# Patient Record
Sex: FEMALE | Race: WHITE | NOT HISPANIC OR LATINO | URBAN - METROPOLITAN AREA
[De-identification: names, ages, dates, MRNs, and addresses within clinical notes are randomized per-mention and may not be internally consistent; named-entity substitution may affect disease eponyms.]

---

## 2017-05-12 ENCOUNTER — OUTPATIENT (OUTPATIENT)
Dept: OUTPATIENT SERVICES | Facility: HOSPITAL | Age: 22
LOS: 1 days | End: 2017-05-12
Payer: MEDICAID

## 2017-05-12 DIAGNOSIS — O26.899 OTHER SPECIFIED PREGNANCY RELATED CONDITIONS, UNSPECIFIED TRIMESTER: ICD-10-CM

## 2017-05-12 DIAGNOSIS — Z3A.00 WEEKS OF GESTATION OF PREGNANCY NOT SPECIFIED: ICD-10-CM

## 2017-05-12 PROCEDURE — 99214 OFFICE O/P EST MOD 30 MIN: CPT | Mod: 25

## 2017-05-12 PROCEDURE — 76818 FETAL BIOPHYS PROFILE W/NST: CPT | Mod: 26

## 2017-05-25 ENCOUNTER — INPATIENT (INPATIENT)
Facility: HOSPITAL | Age: 22
LOS: 1 days | Discharge: HOME | End: 2017-05-27
Attending: SPECIALIST | Admitting: SPECIALIST

## 2017-05-25 VITALS — WEIGHT: 156.53 LBS | HEIGHT: 67 IN

## 2017-05-25 DIAGNOSIS — O26.899 OTHER SPECIFIED PREGNANCY RELATED CONDITIONS, UNSPECIFIED TRIMESTER: ICD-10-CM

## 2017-05-25 DIAGNOSIS — Z3A.00 WEEKS OF GESTATION OF PREGNANCY NOT SPECIFIED: ICD-10-CM

## 2017-05-25 LAB
BASOPHILS # BLD AUTO: 0.01 K/UL — SIGNIFICANT CHANGE UP (ref 0–0.2)
BASOPHILS NFR BLD AUTO: 0.1 % — SIGNIFICANT CHANGE UP (ref 0–2)
BLD GP AB SCN SERPL QL: NEGATIVE — SIGNIFICANT CHANGE UP
EOSINOPHIL # BLD AUTO: 0.01 K/UL — SIGNIFICANT CHANGE UP (ref 0–0.5)
EOSINOPHIL NFR BLD AUTO: 0.1 % — SIGNIFICANT CHANGE UP (ref 0–6)
HCT VFR BLD CALC: 38.4 % — SIGNIFICANT CHANGE UP (ref 34.5–45)
HGB BLD-MCNC: 12.7 G/DL — SIGNIFICANT CHANGE UP (ref 11.5–15.5)
IMM GRANULOCYTES NFR BLD AUTO: 0.5 % — SIGNIFICANT CHANGE UP (ref 0–1.5)
LYMPHOCYTES # BLD AUTO: 1.61 K/UL — SIGNIFICANT CHANGE UP (ref 1–3.3)
LYMPHOCYTES # BLD AUTO: 16.7 % — SIGNIFICANT CHANGE UP (ref 13–44)
MCHC RBC-ENTMCNC: 31.8 PG — SIGNIFICANT CHANGE UP (ref 27–34)
MCHC RBC-ENTMCNC: 33.1 % — SIGNIFICANT CHANGE UP (ref 32–36)
MCV RBC AUTO: 96.2 FL — SIGNIFICANT CHANGE UP (ref 80–100)
MONOCYTES # BLD AUTO: 0.65 K/UL — SIGNIFICANT CHANGE UP (ref 0–0.9)
MONOCYTES NFR BLD AUTO: 6.7 % — SIGNIFICANT CHANGE UP (ref 2–14)
NEUTROPHILS # BLD AUTO: 7.31 K/UL — SIGNIFICANT CHANGE UP (ref 1.8–7.4)
NEUTROPHILS NFR BLD AUTO: 75.9 % — SIGNIFICANT CHANGE UP (ref 43–77)
PLATELET # BLD AUTO: 225 K/UL — SIGNIFICANT CHANGE UP (ref 150–400)
PMV BLD: 10.9 FL — SIGNIFICANT CHANGE UP (ref 7–13)
RBC # BLD: 3.99 M/UL — SIGNIFICANT CHANGE UP (ref 3.8–5.2)
RBC # FLD: 13 % — SIGNIFICANT CHANGE UP (ref 10.3–14.5)
RH IG SCN BLD-IMP: POSITIVE — SIGNIFICANT CHANGE UP
RH IG SCN BLD-IMP: POSITIVE — SIGNIFICANT CHANGE UP
WBC # BLD: 9.64 K/UL — SIGNIFICANT CHANGE UP (ref 3.8–10.5)
WBC # FLD AUTO: 9.64 K/UL — SIGNIFICANT CHANGE UP (ref 3.8–10.5)

## 2017-05-25 RX ORDER — LANOLIN
1 OINTMENT (GRAM) TOPICAL EVERY 6 HOURS
Qty: 0 | Refills: 0 | Status: DISCONTINUED | OUTPATIENT
Start: 2017-05-25 | End: 2017-05-27

## 2017-05-25 RX ORDER — OXYTOCIN 10 UNIT/ML
2 VIAL (ML) INJECTION
Qty: 30 | Refills: 0 | Status: DISCONTINUED | OUTPATIENT
Start: 2017-05-25 | End: 2017-05-25

## 2017-05-25 RX ORDER — KETOROLAC TROMETHAMINE 30 MG/ML
30 SYRINGE (ML) INJECTION ONCE
Qty: 0 | Refills: 0 | Status: DISCONTINUED | OUTPATIENT
Start: 2017-05-25 | End: 2017-05-25

## 2017-05-25 RX ORDER — IBUPROFEN 200 MG
600 TABLET ORAL EVERY 6 HOURS
Qty: 0 | Refills: 0 | Status: COMPLETED | OUTPATIENT
Start: 2017-05-25 | End: 2018-04-23

## 2017-05-25 RX ORDER — DIBUCAINE 1 %
1 OINTMENT (GRAM) RECTAL EVERY 4 HOURS
Qty: 0 | Refills: 0 | Status: DISCONTINUED | OUTPATIENT
Start: 2017-05-25 | End: 2017-05-25

## 2017-05-25 RX ORDER — AER TRAVELER 0.5 G/1
1 SOLUTION RECTAL; TOPICAL EVERY 4 HOURS
Qty: 0 | Refills: 0 | Status: DISCONTINUED | OUTPATIENT
Start: 2017-05-25 | End: 2017-05-25

## 2017-05-25 RX ORDER — PRAMOXINE HYDROCHLORIDE 150 MG/15G
1 AEROSOL, FOAM RECTAL EVERY 4 HOURS
Qty: 0 | Refills: 0 | Status: DISCONTINUED | OUTPATIENT
Start: 2017-05-25 | End: 2017-05-26

## 2017-05-25 RX ORDER — IBUPROFEN 200 MG
600 TABLET ORAL EVERY 6 HOURS
Qty: 0 | Refills: 0 | Status: DISCONTINUED | OUTPATIENT
Start: 2017-05-25 | End: 2017-05-27

## 2017-05-25 RX ORDER — PRAMOXINE HYDROCHLORIDE 150 MG/15G
1 AEROSOL, FOAM RECTAL EVERY 4 HOURS
Qty: 0 | Refills: 0 | Status: DISCONTINUED | OUTPATIENT
Start: 2017-05-25 | End: 2017-05-25

## 2017-05-25 RX ORDER — ACETAMINOPHEN 500 MG
975 TABLET ORAL EVERY 6 HOURS
Qty: 0 | Refills: 0 | Status: COMPLETED | OUTPATIENT
Start: 2017-05-25 | End: 2018-04-23

## 2017-05-25 RX ORDER — OXYCODONE HYDROCHLORIDE 5 MG/1
5 TABLET ORAL
Qty: 0 | Refills: 0 | Status: DISCONTINUED | OUTPATIENT
Start: 2017-05-25 | End: 2017-05-27

## 2017-05-25 RX ORDER — TETANUS TOXOID, REDUCED DIPHTHERIA TOXOID AND ACELLULAR PERTUSSIS VACCINE, ADSORBED 5; 2.5; 8; 8; 2.5 [IU]/.5ML; [IU]/.5ML; UG/.5ML; UG/.5ML; UG/.5ML
0.5 SUSPENSION INTRAMUSCULAR ONCE
Qty: 0 | Refills: 0 | Status: DISCONTINUED | OUTPATIENT
Start: 2017-05-25 | End: 2017-05-27

## 2017-05-25 RX ORDER — HYDROCORTISONE 1 %
1 OINTMENT (GRAM) TOPICAL EVERY 4 HOURS
Qty: 0 | Refills: 0 | Status: DISCONTINUED | OUTPATIENT
Start: 2017-05-25 | End: 2017-05-25

## 2017-05-25 RX ORDER — HYDROCORTISONE 1 %
1 OINTMENT (GRAM) TOPICAL EVERY 4 HOURS
Qty: 0 | Refills: 0 | Status: DISCONTINUED | OUTPATIENT
Start: 2017-05-25 | End: 2017-05-26

## 2017-05-25 RX ORDER — MAGNESIUM HYDROXIDE 400 MG/1
30 TABLET, CHEWABLE ORAL
Qty: 0 | Refills: 0 | Status: DISCONTINUED | OUTPATIENT
Start: 2017-05-25 | End: 2017-05-27

## 2017-05-25 RX ORDER — AER TRAVELER 0.5 G/1
1 SOLUTION RECTAL; TOPICAL EVERY 4 HOURS
Qty: 0 | Refills: 0 | Status: DISCONTINUED | OUTPATIENT
Start: 2017-05-25 | End: 2017-05-27

## 2017-05-25 RX ORDER — SODIUM CHLORIDE 9 MG/ML
3 INJECTION INTRAMUSCULAR; INTRAVENOUS; SUBCUTANEOUS EVERY 8 HOURS
Qty: 0 | Refills: 0 | Status: DISCONTINUED | OUTPATIENT
Start: 2017-05-25 | End: 2017-05-25

## 2017-05-25 RX ORDER — SIMETHICONE 80 MG/1
80 TABLET, CHEWABLE ORAL EVERY 6 HOURS
Qty: 0 | Refills: 0 | Status: DISCONTINUED | OUTPATIENT
Start: 2017-05-25 | End: 2017-05-27

## 2017-05-25 RX ORDER — DOCUSATE SODIUM 100 MG
100 CAPSULE ORAL
Qty: 0 | Refills: 0 | Status: DISCONTINUED | OUTPATIENT
Start: 2017-05-25 | End: 2017-05-27

## 2017-05-25 RX ORDER — OXYTOCIN 10 UNIT/ML
333.33 VIAL (ML) INJECTION
Qty: 20 | Refills: 0 | Status: COMPLETED | OUTPATIENT
Start: 2017-05-25

## 2017-05-25 RX ORDER — GLYCERIN ADULT
1 SUPPOSITORY, RECTAL RECTAL AT BEDTIME
Qty: 0 | Refills: 0 | Status: DISCONTINUED | OUTPATIENT
Start: 2017-05-25 | End: 2017-05-27

## 2017-05-25 RX ORDER — OXYCODONE HYDROCHLORIDE 5 MG/1
5 TABLET ORAL EVERY 4 HOURS
Qty: 0 | Refills: 0 | Status: DISCONTINUED | OUTPATIENT
Start: 2017-05-25 | End: 2017-05-27

## 2017-05-25 RX ORDER — SODIUM CHLORIDE 9 MG/ML
1000 INJECTION, SOLUTION INTRAVENOUS
Qty: 0 | Refills: 0 | Status: DISCONTINUED | OUTPATIENT
Start: 2017-05-25 | End: 2017-05-25

## 2017-05-25 RX ORDER — DIBUCAINE 1 %
1 OINTMENT (GRAM) RECTAL EVERY 4 HOURS
Qty: 0 | Refills: 0 | Status: DISCONTINUED | OUTPATIENT
Start: 2017-05-25 | End: 2017-05-27

## 2017-05-25 RX ORDER — OXYTOCIN 10 UNIT/ML
41.67 VIAL (ML) INJECTION
Qty: 20 | Refills: 0 | Status: DISCONTINUED | OUTPATIENT
Start: 2017-05-25 | End: 2017-05-25

## 2017-05-25 RX ORDER — SODIUM CHLORIDE 9 MG/ML
1000 INJECTION, SOLUTION INTRAVENOUS ONCE
Qty: 0 | Refills: 0 | Status: COMPLETED | OUTPATIENT
Start: 2017-05-25 | End: 2017-05-25

## 2017-05-25 RX ORDER — ACETAMINOPHEN 500 MG
975 TABLET ORAL EVERY 6 HOURS
Qty: 0 | Refills: 0 | Status: DISCONTINUED | OUTPATIENT
Start: 2017-05-25 | End: 2017-05-27

## 2017-05-25 RX ORDER — DIPHENHYDRAMINE HCL 50 MG
25 CAPSULE ORAL EVERY 6 HOURS
Qty: 0 | Refills: 0 | Status: DISCONTINUED | OUTPATIENT
Start: 2017-05-25 | End: 2017-05-27

## 2017-05-25 RX ORDER — OXYTOCIN 10 UNIT/ML
333.33 VIAL (ML) INJECTION
Qty: 20 | Refills: 0 | Status: DISCONTINUED | OUTPATIENT
Start: 2017-05-25 | End: 2017-05-25

## 2017-05-25 RX ADMIN — Medication 2 MILLIUNIT(S)/MIN: at 11:58

## 2017-05-25 RX ADMIN — SODIUM CHLORIDE 2000 MILLILITER(S): 9 INJECTION, SOLUTION INTRAVENOUS at 10:30

## 2017-05-25 RX ADMIN — Medication 1000 MILLIUNIT(S)/MIN: at 15:47

## 2017-05-25 RX ADMIN — Medication 30 MILLIGRAM(S): at 17:17

## 2017-05-25 RX ADMIN — SODIUM CHLORIDE 125 MILLILITER(S): 9 INJECTION, SOLUTION INTRAVENOUS at 11:00

## 2017-05-25 RX ADMIN — Medication 125 MILLIUNIT(S)/MIN: at 16:34

## 2017-05-25 NOTE — DISCHARGE NOTE OB - MATERIALS PROVIDED
Immunization Record/Breastfeeding Log/Discharge Medication Information for Patients and Families Pocket Guide/[facility] [place] Screening Program/[facility] Hearing Screen Program/Guide to Postpartum Care/Shaken Baby Prevention Handout/Birth Certificate Instructions

## 2017-05-25 NOTE — DISCHARGE NOTE OB - CARE PROVIDER_API CALL
Derrek Recinos (MD), Obstetrics and Gynecology  [address] [place] [address]  Phone: [phone]  Fax: [phone]

## 2017-05-25 NOTE — DISCHARGE NOTE OB - CARE PLAN
Principal Discharge DX:	Vaginal delivery  Goal:	RECOVERY  Instructions for follow-up, activity and diet:	REGULAR DIET  AMBULATION ECOURAGED

## 2017-05-25 NOTE — DISCHARGE NOTE OB - MEDICATION SUMMARY - MEDICATIONS TO TAKE
I will START or STAY ON the medications listed below when I get home from the hospital:    Prenatal 1 oral capsule  --  by mouth   -- Indication: For Other pregnancy-related conditions, antepartum I will START or STAY ON the medications listed below when I get home from the hospital:    acetaminophen 325 mg oral tablet  -- 3 tab(s) by mouth every 6 hours, As Needed  -- Indication: For Vaginal delivery    ibuprofen 600 mg oral tablet  -- 1 tab(s) by mouth every 6 hours, As Needed  -- Indication: For Vaginal delivery    Prenatal 1 oral capsule  --  by mouth   -- Indication: For Vaginal delivery

## 2017-05-25 NOTE — DISCHARGE NOTE OB - PATIENT PORTAL LINK FT
“You can access the FollowHealth Patient Portal, offered by [facility], by registering with the following website: [email or web address]”

## 2017-05-26 RX ORDER — HYDROCORTISONE 1 %
1 OINTMENT (GRAM) TOPICAL EVERY 4 HOURS
Qty: 0 | Refills: 0 | Status: DISCONTINUED | OUTPATIENT
Start: 2017-05-26 | End: 2017-05-27

## 2017-05-26 RX ORDER — ACETAMINOPHEN 500 MG
3 TABLET ORAL
Qty: 0 | Refills: 0 | DISCHARGE
Start: 2017-05-26

## 2017-05-26 RX ORDER — IBUPROFEN 200 MG
1 TABLET ORAL
Qty: 0 | Refills: 0 | DISCHARGE
Start: 2017-05-26

## 2017-05-26 RX ORDER — PRAMOXINE HYDROCHLORIDE 150 MG/15G
1 AEROSOL, FOAM RECTAL EVERY 4 HOURS
Qty: 0 | Refills: 0 | Status: DISCONTINUED | OUTPATIENT
Start: 2017-05-26 | End: 2017-05-27

## 2017-05-26 RX ADMIN — PRAMOXINE HYDROCHLORIDE 1 APPLICATION(S): 150 AEROSOL, FOAM RECTAL at 06:42

## 2017-05-26 RX ADMIN — PRAMOXINE HYDROCHLORIDE 1 APPLICATION(S): 150 AEROSOL, FOAM RECTAL at 02:00

## 2017-05-26 RX ADMIN — Medication 1 TABLET(S): at 14:35

## 2017-05-26 RX ADMIN — Medication 600 MILLIGRAM(S): at 14:35

## 2017-05-26 RX ADMIN — Medication 600 MILLIGRAM(S): at 15:30

## 2017-05-26 RX ADMIN — PRAMOXINE HYDROCHLORIDE 1 APPLICATION(S): 150 AEROSOL, FOAM RECTAL at 17:09

## 2017-05-26 RX ADMIN — PRAMOXINE HYDROCHLORIDE 1 APPLICATION(S): 150 AEROSOL, FOAM RECTAL at 10:35

## 2017-05-26 NOTE — LACTATION INITIAL EVALUATION - LACTATION INTERVENTIONS
initiate skin to skin/initiate hand expression routine/assisted with deep latch and positioning  discussed  signs  of  effective  feeding and  swallowing.  discussed  compression at  breast when  nbn  stops  drinking  and  is  still sucking.

## 2017-05-26 NOTE — LACTATION INITIAL EVALUATION - INTERVENTION OUTCOME
needs met/demonstrates understanding of teaching/verbalizes understanding/nbn demonstrated  deep latch and  performed  with sucking and swallowing  noted,  pt instructed to notify pediatrician if nbn not feeding 8-12 times/24 hours and not voiding and stooling according to breastfeeding log.

## 2017-05-27 LAB — T PALLIDUM AB TITR SER: NEGATIVE — SIGNIFICANT CHANGE UP

## 2017-05-27 RX ADMIN — Medication 600 MILLIGRAM(S): at 13:01

## 2017-05-27 RX ADMIN — Medication 600 MILLIGRAM(S): at 14:00

## 2017-05-27 RX ADMIN — PRAMOXINE HYDROCHLORIDE 1 APPLICATION(S): 150 AEROSOL, FOAM RECTAL at 17:54

## 2017-05-27 RX ADMIN — PRAMOXINE HYDROCHLORIDE 1 APPLICATION(S): 150 AEROSOL, FOAM RECTAL at 13:02

## 2017-05-28 VITALS
RESPIRATION RATE: 19 BRPM | HEART RATE: 96 BPM | DIASTOLIC BLOOD PRESSURE: 72 MMHG | OXYGEN SATURATION: 96 % | SYSTOLIC BLOOD PRESSURE: 111 MMHG | TEMPERATURE: 99 F

## 2019-07-15 ENCOUNTER — RESULT REVIEW (OUTPATIENT)
Age: 24
End: 2019-07-15

## 2019-08-01 PROBLEM — Z00.00 ENCOUNTER FOR PREVENTIVE HEALTH EXAMINATION: Status: ACTIVE | Noted: 2019-08-01

## 2019-08-19 ENCOUNTER — ASOB RESULT (OUTPATIENT)
Age: 24
End: 2019-08-19

## 2019-08-19 ENCOUNTER — APPOINTMENT (OUTPATIENT)
Dept: ANTEPARTUM | Facility: CLINIC | Age: 24
End: 2019-08-19
Payer: MEDICAID

## 2019-08-19 PROCEDURE — 36416 COLLJ CAPILLARY BLOOD SPEC: CPT

## 2019-08-19 PROCEDURE — 76801 OB US < 14 WKS SINGLE FETUS: CPT

## 2019-08-19 PROCEDURE — 76813 OB US NUCHAL MEAS 1 GEST: CPT

## 2019-10-11 ENCOUNTER — ASOB RESULT (OUTPATIENT)
Age: 24
End: 2019-10-11

## 2019-10-11 ENCOUNTER — TRANSCRIPTION ENCOUNTER (OUTPATIENT)
Age: 24
End: 2019-10-11

## 2019-10-11 ENCOUNTER — APPOINTMENT (OUTPATIENT)
Dept: ANTEPARTUM | Facility: CLINIC | Age: 24
End: 2019-10-11
Payer: MEDICAID

## 2019-10-11 PROCEDURE — 76805 OB US >/= 14 WKS SNGL FETUS: CPT

## 2020-02-03 ENCOUNTER — OUTPATIENT (OUTPATIENT)
Dept: OUTPATIENT SERVICES | Facility: HOSPITAL | Age: 25
LOS: 1 days | End: 2020-02-03

## 2020-02-03 ENCOUNTER — ASOB RESULT (OUTPATIENT)
Age: 25
End: 2020-02-03

## 2020-02-03 ENCOUNTER — APPOINTMENT (OUTPATIENT)
Dept: ANTEPARTUM | Facility: CLINIC | Age: 25
End: 2020-02-03
Payer: MEDICAID

## 2020-02-03 PROCEDURE — 76820 UMBILICAL ARTERY ECHO: CPT

## 2020-02-03 PROCEDURE — 76816 OB US FOLLOW-UP PER FETUS: CPT

## 2020-02-03 PROCEDURE — 76818 FETAL BIOPHYS PROFILE W/NST: CPT | Mod: 26

## 2020-02-07 DIAGNOSIS — Z3A.36 36 WEEKS GESTATION OF PREGNANCY: ICD-10-CM

## 2020-02-07 DIAGNOSIS — O36.5930 MATERNAL CARE FOR OTHER KNOWN OR SUSPECTED POOR FETAL GROWTH, THIRD TRIMESTER, NOT APPLICABLE OR UNSPECIFIED: ICD-10-CM

## 2020-02-07 DIAGNOSIS — O26.843 UTERINE SIZE-DATE DISCREPANCY, THIRD TRIMESTER: ICD-10-CM

## 2020-02-10 ENCOUNTER — APPOINTMENT (OUTPATIENT)
Dept: ANTEPARTUM | Facility: CLINIC | Age: 25
End: 2020-02-10
Payer: MEDICAID

## 2020-02-10 ENCOUNTER — ASOB RESULT (OUTPATIENT)
Age: 25
End: 2020-02-10

## 2020-02-10 ENCOUNTER — OUTPATIENT (OUTPATIENT)
Dept: OUTPATIENT SERVICES | Facility: HOSPITAL | Age: 25
LOS: 1 days | End: 2020-02-10

## 2020-02-10 ENCOUNTER — APPOINTMENT (OUTPATIENT)
Dept: ANTEPARTUM | Facility: HOSPITAL | Age: 25
End: 2020-02-10

## 2020-02-10 PROCEDURE — 76818 FETAL BIOPHYS PROFILE W/NST: CPT | Mod: 26

## 2020-02-10 PROCEDURE — 76820 UMBILICAL ARTERY ECHO: CPT

## 2020-02-18 ENCOUNTER — ASOB RESULT (OUTPATIENT)
Age: 25
End: 2020-02-18

## 2020-02-18 ENCOUNTER — APPOINTMENT (OUTPATIENT)
Dept: ANTEPARTUM | Facility: HOSPITAL | Age: 25
End: 2020-02-18

## 2020-02-18 ENCOUNTER — APPOINTMENT (OUTPATIENT)
Dept: ANTEPARTUM | Facility: CLINIC | Age: 25
End: 2020-02-18
Payer: MEDICAID

## 2020-02-18 PROCEDURE — 76819 FETAL BIOPHYS PROFIL W/O NST: CPT

## 2020-02-18 PROCEDURE — 76816 OB US FOLLOW-UP PER FETUS: CPT

## 2020-02-19 DIAGNOSIS — O36.5930 MATERNAL CARE FOR OTHER KNOWN OR SUSPECTED POOR FETAL GROWTH, THIRD TRIMESTER, NOT APPLICABLE OR UNSPECIFIED: ICD-10-CM

## 2020-02-19 DIAGNOSIS — Z3A.37 37 WEEKS GESTATION OF PREGNANCY: ICD-10-CM

## 2020-02-24 ENCOUNTER — APPOINTMENT (OUTPATIENT)
Dept: ANTEPARTUM | Facility: HOSPITAL | Age: 25
End: 2020-02-24

## 2020-02-24 ENCOUNTER — APPOINTMENT (OUTPATIENT)
Dept: ANTEPARTUM | Facility: CLINIC | Age: 25
End: 2020-02-24

## 2020-02-26 ENCOUNTER — OUTPATIENT (OUTPATIENT)
Dept: INPATIENT UNIT | Facility: HOSPITAL | Age: 25
LOS: 1 days | Discharge: HOME | End: 2020-02-26
Payer: MEDICAID

## 2020-02-26 VITALS
TEMPERATURE: 99 F | HEART RATE: 76 BPM | DIASTOLIC BLOOD PRESSURE: 57 MMHG | SYSTOLIC BLOOD PRESSURE: 98 MMHG | RESPIRATION RATE: 16 BRPM

## 2020-02-26 VITALS — DIASTOLIC BLOOD PRESSURE: 57 MMHG | SYSTOLIC BLOOD PRESSURE: 105 MMHG | HEART RATE: 78 BPM

## 2020-02-26 DIAGNOSIS — O26.899 OTHER SPECIFIED PREGNANCY RELATED CONDITIONS, UNSPECIFIED TRIMESTER: ICD-10-CM

## 2020-02-26 DIAGNOSIS — Z3A.00 WEEKS OF GESTATION OF PREGNANCY NOT SPECIFIED: ICD-10-CM

## 2020-02-26 PROCEDURE — 99213 OFFICE O/P EST LOW 20 MIN: CPT | Mod: 25

## 2020-02-26 PROCEDURE — 76815 OB US LIMITED FETUS(S): CPT | Mod: 26

## 2020-02-26 PROCEDURE — 59025 FETAL NON-STRESS TEST: CPT | Mod: 26

## 2020-02-26 NOTE — OB PROVIDER TRIAGE NOTE - ADDITIONAL INSTRUCTIONS
pt reports +FM   maternal and fetal status reassuring   seen and evaluated with dr redding  discharge home  labor precautions d/w pt  increase fluid intake  instructed on fetal kickcounts  follow up with dr loaiza on 2/28/2020  w/v discharge instructions given  discharged home

## 2020-02-26 NOTE — OB PROVIDER TRIAGE NOTE - NSOBPROVIDERNOTE_OBGYN_ALL_OB_FT
y/o  @ 39.1 wks josey presents from Dr Recinos's office for prolonged monitoring pt presented for a PNV today and c/o decreased FM x's " few days" reports +FM , pt was on NST and had an indeterminate baseline in the office denies any uc's vb or lof denies any n/v/d denies any fever or chills ap care uncomplicated thus far      abdomen: soft, nt on palp  FH baseline indeterminate   toco: irregular   T(C): 37.1 (20 @ 16:08), Max: 37.1 (20 @ 16:08)  HR: 84 (20 @ 17:05) (76 - 92)  BP: 101/51 (20 @ 17:05) (98/57 - 101/51)  RR: 16 (20 @ 16:08) (16 - 16)  SpO2: --    allergies:  TUMS- hives  med hx: denies  surg hx: denies  gyn hx: denies  ob hx:   2017  FT 7#7 , no comp  meds: PNV    will continue to monitor 25 y/o    @ 39.1 wks gest presents from Dr Recinos's office for prolonged monitoring pt presented for a PNV today and c/o decreased FM x's " few days" reports +FM , pt was on NST and had an indeterminate baseline in the office denies any uc's vb or lof denies any n/v/d denies any fever or chills ap care uncomplicated thus far      abdomen: soft, nt on palp  FH baseline indeterminate   toco: irregular   T(C): 37.1 (20 @ 16:08), Max: 37.1 (20 @ 16:08)  HR: 84 (20 @ 17:05) (76 - 92)  BP: 101/51 (20 @ 17:05) (98/57 - 101/51)  RR: 16 (20 @ 16:08) (16 - 16)  SpO2: --    allergies:  TUMS- hives  med hx: denies  surgical  hx: denies  gyn hx: denies  ob hx:   2017  FT 7#7 , no comp  meds: PNV    will continue to monitor 23 y/o    @ 39.1 wks gest presents from Dr Recinos's office for prolonged monitoring pt presented for a PNV today and c/o decreased FM x's " few days" reports +FM , pt was on NST and had an indeterminate baseline in the office denies any uc's vb or lof denies any n/v/d denies any fever or chills ap care uncomplicated thus far      abdomen: soft, nt on palp  FH baseline indeterminate   toco: irregular   T(C): 37.1 (20 @ 16:08), Max: 37.1 (20 @ 16:08)  HR: 84 (20 @ 17:05) (76 - 92)  BP: 101/51 (20 @ 17:05) (98/57 - 101/51)  RR: 16 (20 @ 16:08) (16 - 16)  SpO2: --    allergies:  TUMS- hives  med hx: denies  surgical  hx: denies  gyn hx: denies  ob hx:   2017  FT 7#7 , no comp  meds: PNV    will continue to monitor      addendum @ 1830  cat 1 FHT  toco: uterine irritability noted    plan of care d/w dr redding  continue prolonged FM  will continue to monitor 25 y/o    @ 39.1 wks gest presents from Dr Recinos's office for prolonged monitoring pt presented for a PNV today and c/o decreased FM x's " few days" reports +FM , pt was on NST and had an indeterminate baseline in the office denies any uc's vb or lof denies any n/v/d denies any fever or chills ap care uncomplicated thus far      abdomen: soft, nt on palp  FH baseline indeterminate   toco: irregular   T(C): 37.1 (20 @ 16:08), Max: 37.1 (20 @ 16:08)  HR: 84 (20 @ 17:05) (76 - 92)  BP: 101/51 (20 @ 17:05) (98/57 - 101/51)  RR: 16 (20 @ 16:08) (16 - 16)  SpO2: --    allergies:  TUMS- hives  med hx: denies  surgical  hx: denies  gyn hx: denies  ob hx:   2017  FT 7#7 , no comp  meds: PNV    will continue to monitor      addendum @ 1830  cat 1 FHT  toco: uterine irritability noted    plan of care d/w dr redding  continue prolonged FM  will continue to monitor      addendum @ 1900:  EFM tracing reviewed with Dr paez   pt seen with dr redding  cat 1 FHT  toco: irreg    TAS: BPP: 8/8 vtx posterior placenta LAMAR: 7.33       pt reports +FM   maternal and fetal status reassuring   seen and evaluated with dr redding  discharge home  labor precautions d/w pt  increase fluid intake  instructed on fetal kickcounts  follow up with dr recinos on 2020  w/v discharge instructions given  discharged home

## 2020-03-01 ENCOUNTER — TRANSCRIPTION ENCOUNTER (OUTPATIENT)
Age: 25
End: 2020-03-01

## 2020-03-01 ENCOUNTER — INPATIENT (INPATIENT)
Facility: HOSPITAL | Age: 25
LOS: 1 days | Discharge: HOME | End: 2020-03-03
Attending: SPECIALIST | Admitting: SPECIALIST

## 2020-03-01 VITALS
DIASTOLIC BLOOD PRESSURE: 64 MMHG | TEMPERATURE: 98 F | SYSTOLIC BLOOD PRESSURE: 98 MMHG | HEART RATE: 77 BPM | RESPIRATION RATE: 16 BRPM

## 2020-03-01 DIAGNOSIS — Z98.890 OTHER SPECIFIED POSTPROCEDURAL STATES: Chronic | ICD-10-CM

## 2020-03-01 DIAGNOSIS — Z3A.00 WEEKS OF GESTATION OF PREGNANCY NOT SPECIFIED: ICD-10-CM

## 2020-03-01 DIAGNOSIS — O26.899 OTHER SPECIFIED PREGNANCY RELATED CONDITIONS, UNSPECIFIED TRIMESTER: ICD-10-CM

## 2020-03-01 LAB
BASOPHILS # BLD AUTO: 0.01 K/UL — SIGNIFICANT CHANGE UP (ref 0–0.2)
BASOPHILS NFR BLD AUTO: 0.1 % — SIGNIFICANT CHANGE UP (ref 0–2)
BLD GP AB SCN SERPL QL: NEGATIVE — SIGNIFICANT CHANGE UP
EOSINOPHIL # BLD AUTO: 0.02 K/UL — SIGNIFICANT CHANGE UP (ref 0–0.5)
EOSINOPHIL NFR BLD AUTO: 0.3 % — SIGNIFICANT CHANGE UP (ref 0–6)
HCT VFR BLD CALC: 37.6 % — SIGNIFICANT CHANGE UP (ref 34.5–45)
HGB BLD-MCNC: 12.5 G/DL — SIGNIFICANT CHANGE UP (ref 11.5–15.5)
IMM GRANULOCYTES NFR BLD AUTO: 0.4 % — SIGNIFICANT CHANGE UP (ref 0–1.5)
LYMPHOCYTES # BLD AUTO: 1.89 K/UL — SIGNIFICANT CHANGE UP (ref 1–3.3)
LYMPHOCYTES # BLD AUTO: 27.3 % — SIGNIFICANT CHANGE UP (ref 13–44)
MCHC RBC-ENTMCNC: 31.9 PG — SIGNIFICANT CHANGE UP (ref 27–34)
MCHC RBC-ENTMCNC: 33.2 % — SIGNIFICANT CHANGE UP (ref 32–36)
MCV RBC AUTO: 95.9 FL — SIGNIFICANT CHANGE UP (ref 80–100)
MONOCYTES # BLD AUTO: 0.51 K/UL — SIGNIFICANT CHANGE UP (ref 0–0.9)
MONOCYTES NFR BLD AUTO: 7.4 % — SIGNIFICANT CHANGE UP (ref 2–14)
NEUTROPHILS # BLD AUTO: 4.47 K/UL — SIGNIFICANT CHANGE UP (ref 1.8–7.4)
NEUTROPHILS NFR BLD AUTO: 64.5 % — SIGNIFICANT CHANGE UP (ref 43–77)
NRBC # FLD: 0 K/UL — SIGNIFICANT CHANGE UP (ref 0–0)
PLATELET # BLD AUTO: 182 K/UL — SIGNIFICANT CHANGE UP (ref 150–400)
PMV BLD: 10.8 FL — SIGNIFICANT CHANGE UP (ref 7–13)
RBC # BLD: 3.92 M/UL — SIGNIFICANT CHANGE UP (ref 3.8–5.2)
RBC # FLD: 13.5 % — SIGNIFICANT CHANGE UP (ref 10.3–14.5)
RH IG SCN BLD-IMP: POSITIVE — SIGNIFICANT CHANGE UP
T PALLIDUM AB TITR SER: NEGATIVE — SIGNIFICANT CHANGE UP
WBC # BLD: 6.93 K/UL — SIGNIFICANT CHANGE UP (ref 3.8–10.5)
WBC # FLD AUTO: 6.93 K/UL — SIGNIFICANT CHANGE UP (ref 3.8–10.5)

## 2020-03-01 RX ORDER — METOCLOPRAMIDE HCL 10 MG
10 TABLET ORAL ONCE
Refills: 0 | Status: DISCONTINUED | OUTPATIENT
Start: 2020-03-01 | End: 2020-03-01

## 2020-03-01 RX ORDER — DIPHENHYDRAMINE HCL 50 MG
25 CAPSULE ORAL EVERY 6 HOURS
Refills: 0 | Status: DISCONTINUED | OUTPATIENT
Start: 2020-03-01 | End: 2020-03-03

## 2020-03-01 RX ORDER — INFLUENZA VIRUS VACCINE 15; 15; 15; 15 UG/.5ML; UG/.5ML; UG/.5ML; UG/.5ML
0.5 SUSPENSION INTRAMUSCULAR ONCE
Refills: 0 | Status: DISCONTINUED | OUTPATIENT
Start: 2020-03-01 | End: 2020-03-03

## 2020-03-01 RX ORDER — PRAMOXINE HYDROCHLORIDE 150 MG/15G
1 AEROSOL, FOAM RECTAL EVERY 4 HOURS
Refills: 0 | Status: DISCONTINUED | OUTPATIENT
Start: 2020-03-01 | End: 2020-03-03

## 2020-03-01 RX ORDER — AER TRAVELER 0.5 G/1
1 SOLUTION RECTAL; TOPICAL EVERY 4 HOURS
Refills: 0 | Status: DISCONTINUED | OUTPATIENT
Start: 2020-03-01 | End: 2020-03-03

## 2020-03-01 RX ORDER — OXYTOCIN 10 UNIT/ML
333.33 VIAL (ML) INJECTION
Qty: 20 | Refills: 0 | Status: COMPLETED | OUTPATIENT
Start: 2020-03-01 | End: 2020-03-01

## 2020-03-01 RX ORDER — DIBUCAINE 1 %
1 OINTMENT (GRAM) RECTAL EVERY 6 HOURS
Refills: 0 | Status: DISCONTINUED | OUTPATIENT
Start: 2020-03-01 | End: 2020-03-03

## 2020-03-01 RX ORDER — MAGNESIUM HYDROXIDE 400 MG/1
30 TABLET, CHEWABLE ORAL
Refills: 0 | Status: DISCONTINUED | OUTPATIENT
Start: 2020-03-01 | End: 2020-03-03

## 2020-03-01 RX ORDER — SODIUM CHLORIDE 9 MG/ML
1000 INJECTION, SOLUTION INTRAVENOUS
Refills: 0 | Status: DISCONTINUED | OUTPATIENT
Start: 2020-03-01 | End: 2020-03-01

## 2020-03-01 RX ORDER — IBUPROFEN 200 MG
600 TABLET ORAL EVERY 6 HOURS
Refills: 0 | Status: DISCONTINUED | OUTPATIENT
Start: 2020-03-01 | End: 2020-03-03

## 2020-03-01 RX ORDER — CITRIC ACID/SODIUM CITRATE 300-500 MG
15 SOLUTION, ORAL ORAL EVERY 6 HOURS
Refills: 0 | Status: DISCONTINUED | OUTPATIENT
Start: 2020-03-01 | End: 2020-03-01

## 2020-03-01 RX ORDER — SIMETHICONE 80 MG/1
80 TABLET, CHEWABLE ORAL EVERY 4 HOURS
Refills: 0 | Status: DISCONTINUED | OUTPATIENT
Start: 2020-03-01 | End: 2020-03-03

## 2020-03-01 RX ORDER — LANOLIN
1 OINTMENT (GRAM) TOPICAL EVERY 6 HOURS
Refills: 0 | Status: DISCONTINUED | OUTPATIENT
Start: 2020-03-01 | End: 2020-03-03

## 2020-03-01 RX ORDER — OXYCODONE HYDROCHLORIDE 5 MG/1
5 TABLET ORAL
Refills: 0 | Status: DISCONTINUED | OUTPATIENT
Start: 2020-03-01 | End: 2020-03-03

## 2020-03-01 RX ORDER — ACETAMINOPHEN 500 MG
975 TABLET ORAL
Refills: 0 | Status: DISCONTINUED | OUTPATIENT
Start: 2020-03-01 | End: 2020-03-03

## 2020-03-01 RX ORDER — SODIUM CHLORIDE 9 MG/ML
3 INJECTION INTRAMUSCULAR; INTRAVENOUS; SUBCUTANEOUS EVERY 8 HOURS
Refills: 0 | Status: DISCONTINUED | OUTPATIENT
Start: 2020-03-01 | End: 2020-03-03

## 2020-03-01 RX ORDER — OXYCODONE HYDROCHLORIDE 5 MG/1
5 TABLET ORAL ONCE
Refills: 0 | Status: DISCONTINUED | OUTPATIENT
Start: 2020-03-01 | End: 2020-03-03

## 2020-03-01 RX ORDER — KETOROLAC TROMETHAMINE 30 MG/ML
30 SYRINGE (ML) INJECTION ONCE
Refills: 0 | Status: DISCONTINUED | OUTPATIENT
Start: 2020-03-01 | End: 2020-03-01

## 2020-03-01 RX ORDER — HYDROCORTISONE 1 %
1 OINTMENT (GRAM) TOPICAL EVERY 6 HOURS
Refills: 0 | Status: DISCONTINUED | OUTPATIENT
Start: 2020-03-01 | End: 2020-03-03

## 2020-03-01 RX ORDER — IBUPROFEN 200 MG
600 TABLET ORAL EVERY 6 HOURS
Refills: 0 | Status: COMPLETED | OUTPATIENT
Start: 2020-03-01 | End: 2021-01-28

## 2020-03-01 RX ORDER — OXYTOCIN 10 UNIT/ML
333.33 VIAL (ML) INJECTION
Qty: 20 | Refills: 0 | Status: DISCONTINUED | OUTPATIENT
Start: 2020-03-01 | End: 2020-03-02

## 2020-03-01 RX ORDER — TETANUS TOXOID, REDUCED DIPHTHERIA TOXOID AND ACELLULAR PERTUSSIS VACCINE, ADSORBED 5; 2.5; 8; 8; 2.5 [IU]/.5ML; [IU]/.5ML; UG/.5ML; UG/.5ML; UG/.5ML
0.5 SUSPENSION INTRAMUSCULAR ONCE
Refills: 0 | Status: DISCONTINUED | OUTPATIENT
Start: 2020-03-01 | End: 2020-03-03

## 2020-03-01 RX ORDER — BENZOCAINE 10 %
1 GEL (GRAM) MUCOUS MEMBRANE EVERY 6 HOURS
Refills: 0 | Status: DISCONTINUED | OUTPATIENT
Start: 2020-03-01 | End: 2020-03-03

## 2020-03-01 RX ORDER — GLYCERIN ADULT
1 SUPPOSITORY, RECTAL RECTAL AT BEDTIME
Refills: 0 | Status: DISCONTINUED | OUTPATIENT
Start: 2020-03-01 | End: 2020-03-03

## 2020-03-01 RX ORDER — OXYTOCIN 10 UNIT/ML
2 VIAL (ML) INJECTION
Qty: 30 | Refills: 0 | Status: DISCONTINUED | OUTPATIENT
Start: 2020-03-01 | End: 2020-03-01

## 2020-03-01 RX ADMIN — Medication 975 MILLIGRAM(S): at 18:27

## 2020-03-01 RX ADMIN — Medication 1000 MILLIUNIT(S)/MIN: at 09:39

## 2020-03-01 RX ADMIN — Medication 600 MILLIGRAM(S): at 21:19

## 2020-03-01 RX ADMIN — Medication 600 MILLIGRAM(S): at 20:19

## 2020-03-01 RX ADMIN — SODIUM CHLORIDE 3 MILLILITER(S): 9 INJECTION INTRAMUSCULAR; INTRAVENOUS; SUBCUTANEOUS at 22:00

## 2020-03-01 RX ADMIN — SODIUM CHLORIDE 125 MILLILITER(S): 9 INJECTION, SOLUTION INTRAVENOUS at 07:38

## 2020-03-01 RX ADMIN — Medication 2 MILLIUNIT(S)/MIN: at 07:38

## 2020-03-01 RX ADMIN — Medication 975 MILLIGRAM(S): at 17:27

## 2020-03-01 NOTE — DISCHARGE NOTE OB - MEDICATION SUMMARY - MEDICATIONS TO TAKE
I will START or STAY ON the medications listed below when I get home from the hospital:  None I will START or STAY ON the medications listed below when I get home from the hospital:    acetaminophen 325 mg oral tablet  -- 3 tab(s) by mouth   -- Indication: For nsd    ibuprofen 600 mg oral tablet  -- 1 tab(s) by mouth every 6 hours  -- Indication: For nsd    Prenatal Multivitamins with Folic Acid 1 mg oral tablet  -- 1 tab(s) by mouth once a day  -- Indication: For nsd

## 2020-03-01 NOTE — OB PROVIDER TRIAGE NOTE - NSHPPHYSICALEXAM_GEN_ALL_CORE
Vital Signs Last 24 Hrs  T(C): 36.8 (01 Mar 2020 00:37), Max: 36.8 (01 Mar 2020 00:37)  T(F): 98.2 (01 Mar 2020 00:37), Max: 98.2 (01 Mar 2020 00:37)  HR: 77 (01 Mar 2020 00:44) (77 - 77)  BP: 98/64 (01 Mar 2020 00:37) (98/64 - 98/64)  RR: 16 (01 Mar 2020 00:37) (16 - 16)    Abdomen soft, nontender   lungs ctab  heart r/r/r    SVE 3-4/70/-3    Cat 1 tracing. , moderate variability, + accels, no decels   irregular contractions     TAUS cephalic   EFW [id number] by leopolds

## 2020-03-01 NOTE — OB RN DELIVERY SUMMARY - NS_SEPSISRSKCALC_OBGYN_ALL_OB_FT
EOS calculated successfully. EOS Risk Factor: 0.5/1000 live births ([facility] national incidence); GA=39w6d; Temp=98.42; ROM=3.25; GBS='Negative'; Antibiotics='No antibiotics or any antibiotics < 2 hrs prior to birth'

## 2020-03-01 NOTE — DISCHARGE NOTE OB - CARE PROVIDER_API CALL
Derrek Recinos)  Obstetrics and Gynecology  [address] [place] [address]  Phone: [phone]  Fax: [phone]  Follow Up Time:

## 2020-03-01 NOTE — DISCHARGE NOTE OB - PATIENT PORTAL LINK FT
You can access the FollowMyHealth Patient Portal offered by [facility] by registering at the following website: [email or web address]. By joining [facility]’s FollowMyHealth portal, you will also be able to view your health information using other applications (apps) compatible with our system.

## 2020-03-01 NOTE — CHART NOTE - NSCHARTNOTEFT_GEN_A_CORE
R2 Labor Note    went to assess patient for episode of non-responsiveness. patient's  called out for help after patient's epidural placement. he reports that she felt dizzy and brought her hands to her face and was briefly unresponsive. patient noted to be severely hypotensive upon initial evaluation.     T(C): 36.5 (03-01-20 @ 02:48), Max: 36.8 (03-01-20 @ 00:37)  HR: 76 (03-01-20 @ 02:48) (75 - 77)  BP: 114/78 (03-01-20 @ 02:48) (98/64 - 114/-)  RR: 16 (03-01-20 @ 02:48) (16 - 16)  SpO2: 100% (03-01-20 @ 02:10) (100% - 100%)    FHT: cat 1 throughout  toco: q5mins  VE: not performed    pt alert and responsive while team was bedside  suspect pre-syncopal vs. syncopal episode in the setting of hypotension following epidural placement  BPs improved with anesthesia medication administration  no evidence of fetal distress  continue with expectant management    CHELSEA Collier PGY2  d/w Dr. Delgado and Viktor, bedside for eval
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
## 2020-03-01 NOTE — OB RN PATIENT PROFILE - NS_REVCONTRACEPTIVE_OBGYN_ALL_OB
Nutrition Care Plan    Nutrition Diagnosis:   Inadequate intake related to respiratory status/on Bipap as evidenced by NPO.    Intervention:    Enteral formula/solution: TF to start. Recommend 1.2 calorie formula no fiber (Osmolite 1.2), start at 20 mL/hr and increase by 10-20 mL/hr every 8 hours as tolerated to goal rate of 50 mL/hr -  Enteral Nutrition Formula: 1.2 Calorie Formula - no fiber  Access Site: [place] [phone] (comment)  Goal Rate: 50 mL/hr (1440 calories, 66 gm protein, 984 mL free water from formula)     Monitoring and Evaluation:  Total energy intake:  Goal is to meet nutrition needs       No

## 2020-03-01 NOTE — DISCHARGE NOTE OB - CARE PLAN
Principal Discharge DX:	Labor without complication  Goal:	routine  Assessment and plan of treatment:	6 weeks

## 2020-03-01 NOTE — OB PROVIDER TRIAGE NOTE - NS_DISPOSITION_OBGYN_ALL_OB
Anya Wheeler PA-C    Admit to Labor and Delivery

## 2020-03-01 NOTE — OB PROVIDER H&P - NSHPPHYSICALEXAM_GEN_ALL_CORE
Vital Signs Last 24 Hrs  T(C): 36.8 (01 Mar 2020 00:37), Max: 36.8 (01 Mar 2020 00:37)  T(F): 98.2 (01 Mar 2020 00:37), Max: 98.2 (01 Mar 2020 00:37)  HR: 77 (01 Mar 2020 00:44) (77 - 77)  BP: 98/64 (01 Mar 2020 00:37) (98/64 - 98/64)  RR: 16 (01 Mar 2020 00:37) (16 - 16)    Abdomen soft, nontender   lungs ctab  heart r/r/r    SVE 3-4/70/-3    Cat 1 tracing. , moderate variability, + accels, no decels   irregular contractions     TAUS cephalic   EFW 3605 by leopolds

## 2020-03-01 NOTE — OB PROVIDER H&P - ASSESSMENT
Evidence of labor     d/w Dr. Wilcox     -Admit to L&D for expectant management   -Routine orders placed   -Approved for epidural

## 2020-03-01 NOTE — CHART NOTE - NSCHARTNOTEFT_GEN_A_CORE
R2 Labor Note    went to assess patient for cervical change    T(C): 36.5 (03-01-20 @ 02:48), Max: 36.8 (03-01-20 @ 00:37)  HR: 78 (03-01-20 @ 05:55) (57 - 82)  BP: 105/59 (03-01-20 @ 05:46) (88/50 - 118/67)  RR: 16 (03-01-20 @ 02:48) (16 - 16)  SpO2: 100% (03-01-20 @ 05:55) (90% - 100%)    FHT: cat 1  toco: q4mins  VE: 4.5/80/-3    AROMed, clear  c/w expectant management  pitocin TOMASAN    CHELSEA Collier PGY2  d/w Dr. Wilcox

## 2020-03-02 ENCOUNTER — APPOINTMENT (OUTPATIENT)
Dept: ANTEPARTUM | Facility: CLINIC | Age: 25
End: 2020-03-02

## 2020-03-02 RX ORDER — ACETAMINOPHEN 500 MG
3 TABLET ORAL
Qty: 0 | Refills: 0 | DISCHARGE
Start: 2020-03-02

## 2020-03-02 RX ORDER — IBUPROFEN 200 MG
1 TABLET ORAL
Qty: 0 | Refills: 0 | DISCHARGE
Start: 2020-03-02

## 2020-03-02 RX ADMIN — Medication 975 MILLIGRAM(S): at 06:06

## 2020-03-02 RX ADMIN — Medication 975 MILLIGRAM(S): at 06:59

## 2020-03-02 RX ADMIN — Medication 1 TABLET(S): at 13:02

## 2020-03-02 NOTE — LACTATION INITIAL EVALUATION - INTERVENTION OUTCOME
Reviewed feeding on demand, recognizing feeding cues and utilizing feeding log. Safe skin to skin, safe sleep and rooming in promoted. Pt declines positioning assistance at this time. Pt encouraged to call for assistance when needed.
verbalizes understanding/demonstrates understanding of teaching/discussed benefits of breastfeeding, supply and demand, feeding on demand, feeding cues, wet and soiled diaper log;  encouraged exclusive breastfeeding and to ask for assistance as needed./good return demonstration

## 2020-03-02 NOTE — LACTATION INITIAL EVALUATION - LACTATION INTERVENTIONS
assisted to put baby to rt. breast in football hold position with deep latch and noted to suck with stimulation/initiate skin to skin

## 2020-03-02 NOTE — LACTATION INITIAL EVALUATION - DELIVERY DATE
01-Mar-2020
Decrease Prednisone  Continue oxygen, Nebs  Maintain spo2>90%  May need honme O2
Decrease Prednisone  Continue oxygen, Nebs  Maintain spo2>90%  May need honme O2

## 2020-03-02 NOTE — PROGRESS NOTE ADULT - SUBJECTIVE AND OBJECTIVE BOX
S: Patient doing well. Minimal lochia. Pain controlled.    O: Vital Signs Last 24 Hrs  T(C): 36.6 (02 Mar 2020 05:48), Max: 36.9 (01 Mar 2020 08:57)  T(F): 97.9 (02 Mar 2020 05:48), Max: 98.42 (01 Mar 2020 08:57)  HR: 66 (02 Mar 2020 05:48) (63 - 123)  BP: 115/73 (02 Mar 2020 05:48) (101/58 - 118/70)  BP(mean): --  RR: 18 (02 Mar 2020 05:48) (17 - 18)  SpO2: 100% (02 Mar 2020 05:48) (90% - 100%)    Gen: NAD  Abd: soft, NT, ND, fundus firm below umbilicus  Lochia: moderate  Ext: no tenderness    Labs:                        12.5   6.93  )-----------( 182      ( 01 Mar 2020 02:00 )             37.6   ABpos    A: 24y PPD#1 s/p  doing well.    Plan:  continue care  discharge instructions given

## 2020-03-03 VITALS
SYSTOLIC BLOOD PRESSURE: 114 MMHG | RESPIRATION RATE: 18 BRPM | OXYGEN SATURATION: 100 % | DIASTOLIC BLOOD PRESSURE: 75 MMHG | HEART RATE: 59 BPM | TEMPERATURE: 98 F

## 2020-04-10 NOTE — OB RN PATIENT PROFILE - SURGICAL SITE INCISION
From: [facility]ie [facility] Suits  To: Lili Mathur MD  Sent: 4/9/2020 2:37 PM CDT  Subject: Other    On Monday 4/13/2020 at 2:30 I have an appointment with Dr Kelly Joy for a download. Do I come into the office or just make sure the CPAP in plugged in?     Thanks  Darinel Crane no

## 2021-09-08 NOTE — OB RN TRIAGE NOTE - FALL HARM RISK CONCLUSION
September 8, 2021       Ana Calles MD  [address]  [address]  [facility] [address]  Via Fax: [phone]      Patient: Abbi Coreas   YOB: 1978   Date of Visit: 9/8/2021       Dear Dr. Calles:    I saw your patient, Abbi Coreas, for an evaluation. Below are my notes for this visit with her.    If you have questions, please do not hesitate to call me.      Sincerely,        Marizol Ron MD        CC: No Recipients  Marizol Ron MD  9/8/2021  3:30 PM  Signed  [facility]  Hematology/Oncology Follow Up Note    Abbi Coreas   1978   [id number]       09/08/21    Reason for Visit:  Follow-up for breast cancer on anastrozole.  Chief Complaint   Patient presents with   • Follow-up       History of Present Illness:   Abbi Coreas is a 43 year old female seen today for breast cancer.    Screening mammogram and US on 10/16/20 showed 0,.6 cm round lesion in right breast was indeterminate.    US breast diagnostic on 10/20/20 showed the new 7 mm x 6 mm x 7 mm taller than wide oval mass in the right breast was at a high suspicion for malignancy.     PT underwent biopsy of right breast mass on 11/3/20 which showed malignant invasive ductal carcinoma with tubular features and rare micro calcifications, grade 1, present in multiple core fragments, longest tumor core 0.5 cm. Ductal carcinoma in situ, nuclear grade 2, cribriform type, minor component. ER positive 90%, LA positive 95%, and Ki-67 12%.    Pt underwent bilateral mastectomy and sentinel lymph node biopsy on 12/18/20. Surgical pathology showed right breast infiltrating ductal carcinoma, grade 1 of 3, measured 0.9 cm in greatest dimension and 0.6 cm from the anterior surgical resection margin. DCIS nuclear grade 2 of 3, associated with infiltrating carcinoma. Lymph nodes negative for carcinoma.    Mammaprint low risk.     Date of Service September 8th, 2021:  Ms. Coreas returns for  follow-up. Notes a toenail recently fell off. Notes some cramping as well. Implant exchange went well. She had bilateral mastectomy. She is seeing gynecologist this fall. Denies CP, SOB, leg swelling. She is vaccinated for COVID-19.    Review of Systems   Constitutional: Negative.    HENT: Negative.    Eyes: Negative.    Respiratory: Negative.    Cardiovascular: Negative.    Gastrointestinal: Negative.    Endocrine: Negative.    Genitourinary: Negative.    Musculoskeletal: Positive for arthralgias.   Skin: Negative.    Allergic/Immunologic: Negative.    Neurological: Negative.    Hematological: Negative.    Psychiatric/Behavioral: Negative.         Objective:     BP (!) 138/98   Pulse (!) 58   Temp 97.5 °F (36.4 °C)   Ht 5' 7\" (1.702 m)   Wt 74.3 kg (163 lb 12.8 oz)   LMP 08/28/2021   BMI 25.65 kg/m²   BSA 1.86 m²    ECOG [09/08/21 1529]   ECOG Performance Status 0         Physical Exam  Constitutional:       General: She is not in acute distress.     Appearance: She is well-developed. She is not diaphoretic.   HENT:      Head: Normocephalic.      Mouth/Throat:      Pharynx: No oropharyngeal exudate.   Eyes:      General: No scleral icterus.     Pupils: Pupils are equal, round, and reactive to light.   Cardiovascular:      Rate and Rhythm: Normal rate and regular rhythm.      Heart sounds: Normal heart sounds. No murmur heard.   No friction rub. No gallop.    Pulmonary:      Effort: Pulmonary effort is normal. No respiratory distress.      Breath sounds: Normal breath sounds.   Abdominal:      General: There is no distension.      Palpations: Abdomen is soft.   Musculoskeletal:         General: Normal range of motion.      Cervical back: Normal range of motion and neck supple.   Skin:     General: Skin is warm and dry.      Findings: No erythema.   Neurological:      Mental Status: She is alert and oriented to person, place, and time.      Cranial Nerves: No cranial nerve deficit.          Admission on  07/09/2021, Discharged on 07/09/2021   Component Date Value Ref Range Status   • URINE PREGNANCY,QUAL 07/09/2021 Negative  Negative Final   • Internal Procedural Controls Accep* 07/09/2021 No   Final        Imaging studies were reviewed with the following pertinent positives: none     ASSESSMENT AND PLAN:  1. Infiltrating ductal carcinoma of the right breast. Grade 1. Stage IA, T1b. Gary lymph nodes negative. Mammaprint low. ER/UT positive. Her-2 negative. Ki-67 12%. Underwent bilateral mastectomy.  Patient started tamoxifen February 1, 2021.  2. DCIS nuclear grade 2, associated with infiltrating carcinoma    · Patient started tamoxifen, will continue for 10 years. Overall tolerating well. Notes some mild muscle aches and pains. No clinical signs of DVT.  · Pt underwent bilateral mastectomy. I discussed option for MRI screening every 2 years, starting 2 years from diagnosis to look at implant and screen chest wall. Pt was agreeable.  · Continue magnesium.  · Continue exercising. This is helpful in weight maintenance and risk reduction.   · I recommend continued clinical follow-up with me. She will not need routine mammograms. Will plan to alternate 3 month follow-up visits with Dr. Hunt. Plan to eventually space out to 6 month follow-up.  · I encouraged patient to get routine cancer screenings.    · Advised pt to receive COVID-19 booster when eligible.    RTC MD 6 months.  Patient to see Dr. Hunt in 3 months.    On 09/08/21, ITabatha scribed the services personally performed by Marizol Ron MD    The documentation recorded by the scribe accurately and completely reflects the service(s) I personally performed and the decisions made by me.     Recent PHQ 2/9 Score    PHQ 2:  Date Adult PHQ 2 Score Adult PHQ 2 Interpretation   9/8/2021 0 No further screening needed       PHQ 9:       Distress Score   Distress Management Group      Distress Scale (0-10)                       Universal Safety Interventions

## 2022-06-01 ENCOUNTER — RESULT REVIEW (OUTPATIENT)
Age: 27
End: 2022-06-01

## 2022-07-12 ENCOUNTER — ASOB RESULT (OUTPATIENT)
Age: 27
End: 2022-07-12

## 2022-07-12 ENCOUNTER — APPOINTMENT (OUTPATIENT)
Dept: ANTEPARTUM | Facility: CLINIC | Age: 27
End: 2022-07-12

## 2022-07-12 PROCEDURE — 76813 OB US NUCHAL MEAS 1 GEST: CPT

## 2022-07-12 PROCEDURE — 76801 OB US < 14 WKS SINGLE FETUS: CPT

## 2022-07-15 ENCOUNTER — APPOINTMENT (OUTPATIENT)
Dept: OTOLARYNGOLOGY | Facility: CLINIC | Age: 27
End: 2022-07-15

## 2022-08-30 ENCOUNTER — APPOINTMENT (OUTPATIENT)
Dept: ANTEPARTUM | Facility: CLINIC | Age: 27
End: 2022-08-30

## 2022-08-30 ENCOUNTER — ASOB RESULT (OUTPATIENT)
Age: 27
End: 2022-08-30

## 2022-08-30 PROCEDURE — 76805 OB US >/= 14 WKS SNGL FETUS: CPT

## 2022-10-09 ENCOUNTER — OUTPATIENT (OUTPATIENT)
Dept: INPATIENT UNIT | Facility: HOSPITAL | Age: 27
LOS: 1 days | Discharge: HOME | End: 2022-10-09

## 2022-10-09 VITALS
HEART RATE: 79 BPM | SYSTOLIC BLOOD PRESSURE: 98 MMHG | TEMPERATURE: 98 F | DIASTOLIC BLOOD PRESSURE: 57 MMHG | RESPIRATION RATE: 16 BRPM

## 2022-10-09 VITALS — HEART RATE: 80 BPM | SYSTOLIC BLOOD PRESSURE: 106 MMHG | DIASTOLIC BLOOD PRESSURE: 55 MMHG

## 2022-10-09 DIAGNOSIS — Z98.890 OTHER SPECIFIED POSTPROCEDURAL STATES: Chronic | ICD-10-CM

## 2022-10-09 DIAGNOSIS — O26.899 OTHER SPECIFIED PREGNANCY RELATED CONDITIONS, UNSPECIFIED TRIMESTER: ICD-10-CM

## 2022-10-09 DIAGNOSIS — Z3A.00 WEEKS OF GESTATION OF PREGNANCY NOT SPECIFIED: ICD-10-CM

## 2022-10-09 LAB
APPEARANCE UR: CLEAR — SIGNIFICANT CHANGE UP
BILIRUB UR-MCNC: NEGATIVE — SIGNIFICANT CHANGE UP
COLOR SPEC: SIGNIFICANT CHANGE UP
DIFF PNL FLD: NEGATIVE — SIGNIFICANT CHANGE UP
GLUCOSE UR QL: NEGATIVE — SIGNIFICANT CHANGE UP
KETONES UR-MCNC: NEGATIVE — SIGNIFICANT CHANGE UP
LEUKOCYTE ESTERASE UR-ACNC: NEGATIVE — SIGNIFICANT CHANGE UP
NITRITE UR-MCNC: NEGATIVE — SIGNIFICANT CHANGE UP
PH UR: 6 — SIGNIFICANT CHANGE UP (ref 5–8)
PROT UR-MCNC: NEGATIVE — SIGNIFICANT CHANGE UP
SP GR SPEC: 1.01 — SIGNIFICANT CHANGE UP (ref 1.01–1.05)
UROBILINOGEN FLD QL: SIGNIFICANT CHANGE UP

## 2022-10-09 NOTE — OB PROVIDER TRIAGE NOTE - NSOBPROVIDERNOTE_OBGYN_ALL_OB_FT
28yo  at 25+4 presenting to triage for left back/flank pain.   Hx. two 's uncomplicated as per patient  NST-Reactive  Negative urine  Negative CVA tenderness  Physical: No step-offs, denies trauma   Pt. endorses last BM this morning and does not have issues with constipation, states she has passed flatus regularly today as well.  Pt. denies dysuria, blood in urine, endorses adequate PO hydration  Pt. educated to take PO tylenol PRN Q8 for pain, warm compress, mild massages to assist with alleviation of intermittent discomfort.  Pain 6/10 seemingly musculoskeletal in nature  Pt. denies any additional medical/surgical/psychiatric episodes or history endorsed.  Pt. denies decreased fetal movement, visualization of bloods, fluids, fever, cough, chills, sob, palpitations, n/v.  Pt. clinically cleared for discharge.  Discussed with

## 2022-10-09 NOTE — OB PROVIDER TRIAGE NOTE - HISTORY OF PRESENT ILLNESS
26yo  at 25+4 presenting to triage for left back/flank pain. Pt. denies decreased fetal movement, visualization of bloods, fluids, fever, cough, chills, sob, palpitations, n/v.

## 2022-10-09 NOTE — OB RN TRIAGE NOTE - FALL HARM RISK - UNIVERSAL INTERVENTIONS
Bed in lowest position, wheels locked, appropriate side rails in place/Call bell, personal items and telephone in reach/Instruct patient to call for assistance before getting out of bed or chair/Non-slip footwear when patient is out of bed/[place] to call system/Physically safe environment - no spills, clutter or unnecessary equipment/Purposeful Proactive Rounding/Room/bathroom lighting operational, light cord in reach

## 2022-10-19 NOTE — OB RN TRIAGE NOTE - TEMPERATURE IN CELSIUS (DEGREES C)
What Is The Reason For Today's Visit?: Annual Full Body Skin Examination with No Concerns What Is The Reason For Today's Visit? (Being Monitored For X): concerning skin lesions on an annual basis 36.8

## 2022-12-02 ENCOUNTER — OUTPATIENT (OUTPATIENT)
Dept: INPATIENT UNIT | Facility: HOSPITAL | Age: 27
LOS: 1 days | Discharge: HOME | End: 2022-12-02
Payer: MEDICAID

## 2022-12-02 VITALS — HEART RATE: 96 BPM | SYSTOLIC BLOOD PRESSURE: 91 MMHG | DIASTOLIC BLOOD PRESSURE: 53 MMHG

## 2022-12-02 VITALS
TEMPERATURE: 100 F | RESPIRATION RATE: 16 BRPM | HEART RATE: 111 BPM | DIASTOLIC BLOOD PRESSURE: 73 MMHG | SYSTOLIC BLOOD PRESSURE: 118 MMHG

## 2022-12-02 DIAGNOSIS — Z98.890 OTHER SPECIFIED POSTPROCEDURAL STATES: Chronic | ICD-10-CM

## 2022-12-02 DIAGNOSIS — Z3A.00 WEEKS OF GESTATION OF PREGNANCY NOT SPECIFIED: ICD-10-CM

## 2022-12-02 DIAGNOSIS — O26.899 OTHER SPECIFIED PREGNANCY RELATED CONDITIONS, UNSPECIFIED TRIMESTER: ICD-10-CM

## 2022-12-02 PROCEDURE — 99213 OFFICE O/P EST LOW 20 MIN: CPT | Mod: GC

## 2022-12-02 NOTE — OB PROVIDER TRIAGE NOTE - NSOBPROVIDERNOTE_OBGYN_ALL_OB_FT
26yo  at 33+2 presenting for r/o rupture in the setting of fever   - Patient is not ruptured, no signs on exam  - Will send RVP  - Will do BPP     D/w Dr. Sheng Lopez, PGY2 28yo  at 33+2 presenting for r/o rupture in the setting of fever   - Patient is not ruptured, no signs on exam  - Will send RVP  - Will do BPP     D/w Dr. Sheng Lopez, PGY2    Addendum 12:22AM   Patient seen at bedside for BPP, .   Patient overall well appearing, ruled out for rupture by Dr. Lopez PGY2. Desires discharge home.   RVP to be performed prior to discharge.   Return precautions reviewed.     d/w Dr. Sheng Bundy MD, PGY4 26yo  at 33+2 presenting for r/o rupture in the setting of fever   - Patient is not ruptured, no signs on exam  - Will send RVP  - Will do BPP     D/w Dr. Sheng Lopez, PGY2    Addendum 12:22AM   Patient seen at bedside for BPP, .   Patient overall well appearing, ruled out for rupture by Dr. Lopez PGY2. Desires discharge home.   RVP to be performed prior to discharge.   Return precautions reviewed.     d/w Dr. Sheng Bundy MD, PGY4    ------  - Patient stable for discharge home with adequate outpatient follow up  - Instructed patient to follow up with OB/GYN as scheduled  - Educated and discussed  labor precautions  - Educated and discussed concerning signs/symptoms to call OB/GYN and return to L&D including but not limited to vaginal bleeding, leakage of fluid, painful contractions, decreased fetal movement, fever/chills, shortness of breath, chest pain, rash, difficulty ambulating, nausea/vomiting/diarrhea, worsening of symptoms  - Patient states she understands and agrees with assessment and plan, all questions answered  - Discharged by BAYRON WEINSTEIN  - Discussed with Dr. Patricio 26yo  at 33+2 presenting for r/o rupture in the setting of fever   - Patient is not ruptured, no signs on exam  - Will send RVP  - Will do BPP     D/w Dr. Sheng Lopez, PGY2    Addendum 12:22AM   Patient seen at bedside for BPP, .   Patient overall well appearing, ruled out for rupture by Dr. Lopez PGY2. Desires discharge home.   RVP to be performed prior to discharge.   Return precautions reviewed.     d/w Dr. Sheng Bundy MD, PGY4    ------  - Patient stable for discharge home with adequate outpatient follow up  - Will call patient with RVP results  - Instructed patient to follow up with OB/GYN as scheduled  - Educated and discussed  labor precautions  - Educated and discussed concerning signs/symptoms to call OB/GYN and return to L&D including but not limited to vaginal bleeding, leakage of fluid, painful contractions, decreased fetal movement, fever/chills, shortness of breath, chest pain, rash, difficulty ambulating, nausea/vomiting/diarrhea, worsening of symptoms  - Patient states she understands and agrees with assessment and plan, all questions answered  - Discharged by BAYRON WEINSTEIN  - Discussed with Dr. Patricio 26yo  at 33+2 presenting for r/o rupture in the setting of fever   - Patient is not ruptured, no signs on exam  - Will send RVP  - Will do BPP     D/w Dr. Sheng Lopez, PGY2    Addendum 12:22AM   Patient seen at bedside for BPP, .   Patient overall well appearing, ruled out for rupture by Dr. Lopez PGY2. Desires discharge home.   RVP to be performed prior to discharge.   Return precautions reviewed.     d/w Dr. Sheng Bundy MD, PGY4    ------  - Patient stable for discharge home with adequate outpatient follow up  - Will call patient with RVP results  - Instructed patient to follow up with OB/GYN as scheduled  - Educated and discussed  labor precautions  - Educated and discussed concerning signs/symptoms to call OB/GYN and return to L&D including but not limited to vaginal bleeding, leakage of fluid, painful contractions, decreased fetal movement, fever/chills, shortness of breath, chest pain, rash, difficulty ambulating, nausea/vomiting/diarrhea, worsening of symptoms  - Patient states she understands and agrees with assessment and plan, all questions answered  - Discharged by BAYRON WEINSTEIN  - Discussed with Dr. Patricio    ---  12/3/2022 7:48am BAYRON WEINSTEIN  Patient called and voicemail left with flu + results
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
## 2022-12-02 NOTE — OB PROVIDER TRIAGE NOTE - NSHPPHYSICALEXAM_GEN_ALL_CORE
VS  T(C): 37.0 (12-02-22 @ 21:42)  HR: 96 (12-02-22 @ 23:22)  BP: 91/53 (12-02-22 @ 23:22)  RR: 18 (12-02-22 @ 21:42)  SpO2: --    Gen: A&Ox3 NAD well appearing   CV: RRR  Pulm: Respirations even, unlabored  Abd: Soft, gravid, nontender   Ext: no edema     TAUS: Vtx, LAMAR 11.3, BPP 8/8.

## 2022-12-03 LAB
B PERT DNA SPEC QL NAA+PROBE: SIGNIFICANT CHANGE UP
B PERT+PARAPERT DNA PNL SPEC NAA+PROBE: SIGNIFICANT CHANGE UP
BORDETELLA PARAPERTUSSIS (RAPRVP): SIGNIFICANT CHANGE UP
C PNEUM DNA SPEC QL NAA+PROBE: SIGNIFICANT CHANGE UP
FLUAV H1 2009 PAND RNA SPEC QL NAA+PROBE: DETECTED
FLUBV RNA SPEC QL NAA+PROBE: SIGNIFICANT CHANGE UP
HADV DNA SPEC QL NAA+PROBE: SIGNIFICANT CHANGE UP
HCOV 229E RNA SPEC QL NAA+PROBE: SIGNIFICANT CHANGE UP
HCOV HKU1 RNA SPEC QL NAA+PROBE: SIGNIFICANT CHANGE UP
HCOV NL63 RNA SPEC QL NAA+PROBE: SIGNIFICANT CHANGE UP
HCOV OC43 RNA SPEC QL NAA+PROBE: SIGNIFICANT CHANGE UP
HMPV RNA SPEC QL NAA+PROBE: SIGNIFICANT CHANGE UP
HPIV1 RNA SPEC QL NAA+PROBE: SIGNIFICANT CHANGE UP
HPIV2 RNA SPEC QL NAA+PROBE: SIGNIFICANT CHANGE UP
HPIV3 RNA SPEC QL NAA+PROBE: SIGNIFICANT CHANGE UP
HPIV4 RNA SPEC QL NAA+PROBE: SIGNIFICANT CHANGE UP
M PNEUMO DNA SPEC QL NAA+PROBE: SIGNIFICANT CHANGE UP
RAPID RVP RESULT: DETECTED
RSV RNA SPEC QL NAA+PROBE: SIGNIFICANT CHANGE UP
RV+EV RNA SPEC QL NAA+PROBE: SIGNIFICANT CHANGE UP
SARS-COV-2 RNA SPEC QL NAA+PROBE: SIGNIFICANT CHANGE UP

## 2022-12-06 NOTE — LACTATION INITIAL EVALUATION - REQUESTED BY
TRANSFER - OUT REPORT:    Verbal report given to RN on Christiano Montalvo  being transferred to ER [facility] for routine progression of patient care       Report consisted of patient's Situation, Background, Assessment and   Recommendations(SBAR). Information from the following report(s) ED SBAR was reviewed with the receiving nurse. Union Assessment: No data recorded  Lines:   Peripheral IV 12/05/22 Distal;Left; Anterior Cephalic (Active)        Opportunity for questions and clarification was provided.       Patient transported with:  Registered Nurse          Renae Velez RN  12/06/22 6748 patient/staff

## 2022-12-13 ENCOUNTER — ASOB RESULT (OUTPATIENT)
Age: 27
End: 2022-12-13

## 2022-12-13 ENCOUNTER — APPOINTMENT (OUTPATIENT)
Dept: ANTEPARTUM | Facility: CLINIC | Age: 27
End: 2022-12-13

## 2022-12-13 PROCEDURE — 76816 OB US FOLLOW-UP PER FETUS: CPT

## 2022-12-13 PROCEDURE — 76819 FETAL BIOPHYS PROFIL W/O NST: CPT | Mod: 59

## 2022-12-29 ENCOUNTER — OUTPATIENT (OUTPATIENT)
Dept: INPATIENT UNIT | Facility: HOSPITAL | Age: 27
LOS: 1 days | Discharge: HOME | End: 2022-12-29

## 2022-12-29 VITALS
DIASTOLIC BLOOD PRESSURE: 70 MMHG | HEART RATE: 65 BPM | SYSTOLIC BLOOD PRESSURE: 108 MMHG | TEMPERATURE: 99 F | RESPIRATION RATE: 15 BRPM

## 2022-12-29 VITALS — HEART RATE: 65 BPM | DIASTOLIC BLOOD PRESSURE: 70 MMHG | SYSTOLIC BLOOD PRESSURE: 108 MMHG

## 2022-12-29 DIAGNOSIS — O26.899 OTHER SPECIFIED PREGNANCY RELATED CONDITIONS, UNSPECIFIED TRIMESTER: ICD-10-CM

## 2022-12-29 DIAGNOSIS — Z98.890 OTHER SPECIFIED POSTPROCEDURAL STATES: Chronic | ICD-10-CM

## 2022-12-29 PROCEDURE — 76818 FETAL BIOPHYS PROFILE W/NST: CPT | Mod: 26
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
## 2022-12-29 NOTE — OB PROVIDER TRIAGE NOTE - CARE PLAN
Jono Reyes is a 51year old male. Patient presents with:  Ringing In Ear: c/o ringing in both ears for 6 weeks    HPI:   For the last 6 weeks has been experiencing extreme ringing in his ears. This becomes very loud at times.   There are times when it se per NG   • Prostatitis     per NG   • Sinus problem     Sinus problems; per NG   • Sleep apnea    • Superficial punctate keratitis 2010    OS; per NG      Social History:  Social History    Tobacco Use      Smoking status: Never Smoker      Smokeless tobac as well both of which were essentially normal.  He does not feel that his hearing is changed at all and declines an audiogram today. I discussed tinnitus in detail with him today.   Specifically, we discussed the fact that there are no reliably effective t 1

## 2022-12-29 NOTE — OB PROVIDER TRIAGE NOTE - NSOBPROVIDERNOTE_OBGYN_ALL_OB_FT
26yo  at 37 weeks R/O Labor  0300 discuss with Dr. Meyers  pt with no S/S of labor at this time/Pt report that her pain is gone.   pt to keep self very well hydrated.   stable for discharge  Discharge patient home.  Labor precautions and fetal movements count were reviewed; if not in labor, will follow up with OB for the next schedule appointment, call office tomorrow for an appointment.  Plan of care was reviewed with patient and family; patient states understanding of the above plan.

## 2022-12-29 NOTE — OB PROVIDER TRIAGE NOTE - NSHPPHYSICALEXAM_GEN_ALL_CORE
Vital Signs Last 24 Hrs  T(C): 37.1 (29 Dec 2022 02:11), Max: 37.1 (29 Dec 2022 02:11)  T(F): 98.8 (29 Dec 2022 02:11), Max: 98.8 (29 Dec 2022 02:11)  HR: 65 (29 Dec 2022 02:17) (65 - 65)  BP: 108/70 (29 Dec 2022 02:17) (108/70 - 108/70)  RR: 15 (29 Dec 2022 02:11) (15 - 15)          Gen: A&Ox3 NAD well appearing   CV: RRR  Pulm: Respirations even, unlabored  Abd: Soft, gravid, nontender   Ext: no edema   TAUS: Vital Signs Last 24 Hrs  T(C): 37.1 (29 Dec 2022 02:11), Max: 37.1 (29 Dec 2022 02:11)  T(F): 98.8 (29 Dec 2022 02:11), Max: 98.8 (29 Dec 2022 02:11)  HR: 65 (29 Dec 2022 02:17) (65 - 65)  BP: 108/70 (29 Dec 2022 02:17) (108/70 - 108/70)  RR: 15 (29 Dec 2022 02:11) (15 - 15)          Gen: A&Ox3 NAD well appearing   CV: RRR  Pulm: Respirations even, unlabored  Abd: Soft, gravid, nontender   Ext: no edema   TAUS: transverse presentation to maternal left, posterior placenta, LAMAR 10.99 BPP 8/8  SVE: 1/30/-3

## 2022-12-30 ENCOUNTER — APPOINTMENT (OUTPATIENT)
Dept: ANTEPARTUM | Facility: CLINIC | Age: 27
End: 2022-12-30
Payer: MEDICAID

## 2022-12-30 ENCOUNTER — ASOB RESULT (OUTPATIENT)
Age: 27
End: 2022-12-30

## 2022-12-30 PROCEDURE — 99202 OFFICE O/P NEW SF 15 MIN: CPT | Mod: 25

## 2022-12-30 PROCEDURE — 76816 OB US FOLLOW-UP PER FETUS: CPT

## 2022-12-30 PROCEDURE — 76819 FETAL BIOPHYS PROFIL W/O NST: CPT | Mod: 59

## 2023-01-03 DIAGNOSIS — Z03.79 ENCOUNTER FOR OTHER SUSPECTED MATERNAL AND FETAL CONDITIONS RULED OUT: ICD-10-CM

## 2023-01-03 DIAGNOSIS — O47.03 FALSE LABOR BEFORE 37 COMPLETED WEEKS OF GESTATION, THIRD TRIMESTER: ICD-10-CM

## 2023-01-03 DIAGNOSIS — Z3A.37 37 WEEKS GESTATION OF PREGNANCY: ICD-10-CM

## 2023-01-19 ENCOUNTER — INPATIENT (INPATIENT)
Facility: HOSPITAL | Age: 28
LOS: 0 days | Discharge: HOME | End: 2023-01-20
Attending: SPECIALIST | Admitting: SPECIALIST
Payer: MEDICAID

## 2023-01-19 ENCOUNTER — TRANSCRIPTION ENCOUNTER (OUTPATIENT)
Age: 28
End: 2023-01-19

## 2023-01-19 VITALS
RESPIRATION RATE: 16 BRPM | TEMPERATURE: 98 F | SYSTOLIC BLOOD PRESSURE: 108 MMHG | DIASTOLIC BLOOD PRESSURE: 71 MMHG | HEART RATE: 81 BPM

## 2023-01-19 DIAGNOSIS — O26.899 OTHER SPECIFIED PREGNANCY RELATED CONDITIONS, UNSPECIFIED TRIMESTER: ICD-10-CM

## 2023-01-19 DIAGNOSIS — Z98.890 OTHER SPECIFIED POSTPROCEDURAL STATES: Chronic | ICD-10-CM

## 2023-01-19 PROBLEM — Z78.9 OTHER SPECIFIED HEALTH STATUS: Chronic | Status: ACTIVE | Noted: 2022-12-29

## 2023-01-19 LAB
BASOPHILS # BLD AUTO: 0.01 K/UL — SIGNIFICANT CHANGE UP (ref 0–0.2)
BASOPHILS NFR BLD AUTO: 0.1 % — SIGNIFICANT CHANGE UP (ref 0–2)
BLD GP AB SCN SERPL QL: NEGATIVE — SIGNIFICANT CHANGE UP
COVID-19 SPIKE DOMAIN AB INTERP: POSITIVE
COVID-19 SPIKE DOMAIN ANTIBODY RESULT: >250 U/ML — HIGH
EOSINOPHIL # BLD AUTO: 0.02 K/UL — SIGNIFICANT CHANGE UP (ref 0–0.5)
EOSINOPHIL NFR BLD AUTO: 0.2 % — SIGNIFICANT CHANGE UP (ref 0–6)
HCT VFR BLD CALC: 33.8 % — LOW (ref 34.5–45)
HGB BLD-MCNC: 11.1 G/DL — LOW (ref 11.5–15.5)
IANC: 5.74 K/UL — SIGNIFICANT CHANGE UP (ref 1.8–7.4)
IMM GRANULOCYTES NFR BLD AUTO: 0.4 % — SIGNIFICANT CHANGE UP (ref 0–0.9)
LYMPHOCYTES # BLD AUTO: 1.79 K/UL — SIGNIFICANT CHANGE UP (ref 1–3.3)
LYMPHOCYTES # BLD AUTO: 22.2 % — SIGNIFICANT CHANGE UP (ref 13–44)
MCHC RBC-ENTMCNC: 30.5 PG — SIGNIFICANT CHANGE UP (ref 27–34)
MCHC RBC-ENTMCNC: 32.8 GM/DL — SIGNIFICANT CHANGE UP (ref 32–36)
MCV RBC AUTO: 92.9 FL — SIGNIFICANT CHANGE UP (ref 80–100)
MONOCYTES # BLD AUTO: 0.47 K/UL — SIGNIFICANT CHANGE UP (ref 0–0.9)
MONOCYTES NFR BLD AUTO: 5.8 % — SIGNIFICANT CHANGE UP (ref 2–14)
NEUTROPHILS # BLD AUTO: 5.74 K/UL — SIGNIFICANT CHANGE UP (ref 1.8–7.4)
NEUTROPHILS NFR BLD AUTO: 71.3 % — SIGNIFICANT CHANGE UP (ref 43–77)
NRBC # BLD: 0 /100 WBCS — SIGNIFICANT CHANGE UP (ref 0–0)
NRBC # FLD: 0 K/UL — SIGNIFICANT CHANGE UP (ref 0–0)
PLATELET # BLD AUTO: 187 K/UL — SIGNIFICANT CHANGE UP (ref 150–400)
RBC # BLD: 3.64 M/UL — LOW (ref 3.8–5.2)
RBC # FLD: 13.2 % — SIGNIFICANT CHANGE UP (ref 10.3–14.5)
RH IG SCN BLD-IMP: POSITIVE — SIGNIFICANT CHANGE UP
SARS-COV-2 IGG+IGM SERPL QL IA: >250 U/ML — HIGH
SARS-COV-2 IGG+IGM SERPL QL IA: POSITIVE
SARS-COV-2 RNA SPEC QL NAA+PROBE: SIGNIFICANT CHANGE UP
T PALLIDUM AB TITR SER: NEGATIVE — SIGNIFICANT CHANGE UP
WBC # BLD: 8.06 K/UL — SIGNIFICANT CHANGE UP (ref 3.8–10.5)
WBC # FLD AUTO: 8.06 K/UL — SIGNIFICANT CHANGE UP (ref 3.8–10.5)

## 2023-01-19 PROCEDURE — 76818 FETAL BIOPHYS PROFILE W/NST: CPT | Mod: 26

## 2023-01-19 RX ORDER — DIPHENHYDRAMINE HCL 50 MG
25 CAPSULE ORAL EVERY 6 HOURS
Refills: 0 | Status: DISCONTINUED | OUTPATIENT
Start: 2023-01-19 | End: 2023-01-20

## 2023-01-19 RX ORDER — PRAMOXINE HYDROCHLORIDE 150 MG/15G
1 AEROSOL, FOAM RECTAL EVERY 4 HOURS
Refills: 0 | Status: DISCONTINUED | OUTPATIENT
Start: 2023-01-19 | End: 2023-01-20

## 2023-01-19 RX ORDER — OXYCODONE HYDROCHLORIDE 5 MG/1
5 TABLET ORAL
Refills: 0 | Status: DISCONTINUED | OUTPATIENT
Start: 2023-01-19 | End: 2023-01-20

## 2023-01-19 RX ORDER — OXYTOCIN 10 UNIT/ML
VIAL (ML) INJECTION
Qty: 30 | Refills: 0 | Status: DISCONTINUED | OUTPATIENT
Start: 2023-01-19 | End: 2023-01-19

## 2023-01-19 RX ORDER — SODIUM CHLORIDE 9 MG/ML
1000 INJECTION, SOLUTION INTRAVENOUS ONCE
Refills: 0 | Status: DISCONTINUED | OUTPATIENT
Start: 2023-01-19 | End: 2023-01-19

## 2023-01-19 RX ORDER — DIBUCAINE 1 %
1 OINTMENT (GRAM) RECTAL EVERY 6 HOURS
Refills: 0 | Status: DISCONTINUED | OUTPATIENT
Start: 2023-01-19 | End: 2023-01-20

## 2023-01-19 RX ORDER — TETANUS TOXOID, REDUCED DIPHTHERIA TOXOID AND ACELLULAR PERTUSSIS VACCINE, ADSORBED 5; 2.5; 8; 8; 2.5 [IU]/.5ML; [IU]/.5ML; UG/.5ML; UG/.5ML; UG/.5ML
0.5 SUSPENSION INTRAMUSCULAR ONCE
Refills: 0 | Status: DISCONTINUED | OUTPATIENT
Start: 2023-01-19 | End: 2023-01-20

## 2023-01-19 RX ORDER — CHLORHEXIDINE GLUCONATE 213 G/1000ML
1 SOLUTION TOPICAL ONCE
Refills: 0 | Status: DISCONTINUED | OUTPATIENT
Start: 2023-01-19 | End: 2023-01-19

## 2023-01-19 RX ORDER — BENZOCAINE 10 %
1 GEL (GRAM) MUCOUS MEMBRANE EVERY 6 HOURS
Refills: 0 | Status: DISCONTINUED | OUTPATIENT
Start: 2023-01-19 | End: 2023-01-20

## 2023-01-19 RX ORDER — HYDROCORTISONE 1 %
1 OINTMENT (GRAM) TOPICAL EVERY 6 HOURS
Refills: 0 | Status: DISCONTINUED | OUTPATIENT
Start: 2023-01-19 | End: 2023-01-20

## 2023-01-19 RX ORDER — MAGNESIUM HYDROXIDE 400 MG/1
30 TABLET, CHEWABLE ORAL
Refills: 0 | Status: DISCONTINUED | OUTPATIENT
Start: 2023-01-19 | End: 2023-01-20

## 2023-01-19 RX ORDER — CITRIC ACID/SODIUM CITRATE 300-500 MG
15 SOLUTION, ORAL ORAL EVERY 6 HOURS
Refills: 0 | Status: DISCONTINUED | OUTPATIENT
Start: 2023-01-19 | End: 2023-01-19

## 2023-01-19 RX ORDER — OXYTOCIN 10 UNIT/ML
4 VIAL (ML) INJECTION
Qty: 30 | Refills: 0 | Status: DISCONTINUED | OUTPATIENT
Start: 2023-01-19 | End: 2023-01-20

## 2023-01-19 RX ORDER — ACETAMINOPHEN 500 MG
975 TABLET ORAL
Refills: 0 | Status: DISCONTINUED | OUTPATIENT
Start: 2023-01-19 | End: 2023-01-20

## 2023-01-19 RX ORDER — OXYCODONE HYDROCHLORIDE 5 MG/1
5 TABLET ORAL ONCE
Refills: 0 | Status: DISCONTINUED | OUTPATIENT
Start: 2023-01-19 | End: 2023-01-20

## 2023-01-19 RX ORDER — SODIUM CHLORIDE 9 MG/ML
3 INJECTION INTRAMUSCULAR; INTRAVENOUS; SUBCUTANEOUS EVERY 8 HOURS
Refills: 0 | Status: DISCONTINUED | OUTPATIENT
Start: 2023-01-19 | End: 2023-01-20

## 2023-01-19 RX ORDER — LANOLIN
1 OINTMENT (GRAM) TOPICAL EVERY 6 HOURS
Refills: 0 | Status: DISCONTINUED | OUTPATIENT
Start: 2023-01-19 | End: 2023-01-20

## 2023-01-19 RX ORDER — IBUPROFEN 200 MG
600 TABLET ORAL EVERY 6 HOURS
Refills: 0 | Status: DISCONTINUED | OUTPATIENT
Start: 2023-01-19 | End: 2023-01-20

## 2023-01-19 RX ORDER — KETOROLAC TROMETHAMINE 30 MG/ML
30 SYRINGE (ML) INJECTION ONCE
Refills: 0 | Status: DISCONTINUED | OUTPATIENT
Start: 2023-01-19 | End: 2023-01-20

## 2023-01-19 RX ORDER — SIMETHICONE 80 MG/1
80 TABLET, CHEWABLE ORAL EVERY 4 HOURS
Refills: 0 | Status: DISCONTINUED | OUTPATIENT
Start: 2023-01-19 | End: 2023-01-20

## 2023-01-19 RX ORDER — OXYTOCIN 10 UNIT/ML
333.33 VIAL (ML) INJECTION
Qty: 20 | Refills: 0 | Status: DISCONTINUED | OUTPATIENT
Start: 2023-01-19 | End: 2023-01-19

## 2023-01-19 RX ORDER — AER TRAVELER 0.5 G/1
1 SOLUTION RECTAL; TOPICAL EVERY 4 HOURS
Refills: 0 | Status: DISCONTINUED | OUTPATIENT
Start: 2023-01-19 | End: 2023-01-20

## 2023-01-19 RX ORDER — IBUPROFEN 200 MG
600 TABLET ORAL EVERY 6 HOURS
Refills: 0 | Status: COMPLETED | OUTPATIENT
Start: 2023-01-19 | End: 2023-12-18

## 2023-01-19 RX ORDER — ACETAMINOPHEN 500 MG
3 TABLET ORAL
Qty: 0 | Refills: 0 | DISCHARGE
Start: 2023-01-19

## 2023-01-19 RX ORDER — OXYTOCIN 10 UNIT/ML
333.33 VIAL (ML) INJECTION
Qty: 20 | Refills: 0 | Status: DISCONTINUED | OUTPATIENT
Start: 2023-01-19 | End: 2023-01-20

## 2023-01-19 RX ORDER — SODIUM CHLORIDE 9 MG/ML
1000 INJECTION, SOLUTION INTRAVENOUS
Refills: 0 | Status: DISCONTINUED | OUTPATIENT
Start: 2023-01-19 | End: 2023-01-19

## 2023-01-19 RX ORDER — IBUPROFEN 200 MG
1 TABLET ORAL
Qty: 0 | Refills: 0 | DISCHARGE
Start: 2023-01-19

## 2023-01-19 RX ADMIN — Medication 600 MILLIGRAM(S): at 18:27

## 2023-01-19 RX ADMIN — Medication 975 MILLIGRAM(S): at 21:33

## 2023-01-19 RX ADMIN — Medication 975 MILLIGRAM(S): at 22:15

## 2023-01-19 RX ADMIN — Medication 1000 MILLIUNIT(S)/MIN: at 14:06

## 2023-01-19 RX ADMIN — Medication 975 MILLIGRAM(S): at 16:00

## 2023-01-19 RX ADMIN — Medication 4 MILLIUNIT(S)/MIN: at 08:59

## 2023-01-19 RX ADMIN — Medication 600 MILLIGRAM(S): at 18:03

## 2023-01-19 RX ADMIN — Medication 975 MILLIGRAM(S): at 15:15

## 2023-01-19 NOTE — OB PROVIDER H&P - PROBLEM SELECTOR PLAN 1
patient admitted for early labor.  plan d/w Dr. Meyers  for pitocin for augmentation  for epidural for pain management  routine orders  gbs negative  covid pcr sent   consents signed by patient

## 2023-01-19 NOTE — OB PROVIDER IHI INDUCTION/AUGMENTATION NOTE - NS_LMP_OBGYN_ALL_OB_DT
Pt presents to the ED with complaints of abdominal cramping that radiates to her back and frequent urination.  No burning with urination, and she has also noticed increased discharged the past day.    13-Apr-2022

## 2023-01-19 NOTE — DISCHARGE NOTE OB - MEDICATION SUMMARY - MEDICATIONS TO TAKE
I will START or STAY ON the medications listed below when I get home from the hospital:    acetaminophen 325 mg oral tablet  -- 3 tab(s) by mouth every 8 hours  -- Indication: For Normal labor and delivery    ibuprofen 600 mg oral tablet  -- 1 tab(s) by mouth every 6 hours  -- Indication: For Normal labor and delivery    Prenatal Multivitamins with Folic Acid 1 mg oral tablet  -- 1 tab(s) by mouth once a day  -- Indication: For Normal labor and delivery

## 2023-01-19 NOTE — DISCHARGE NOTE OB - PATIENT PORTAL LINK FT
You can access the FollowMyHealth Patient Portal offered by [facility] by registering at the following website: [email or web address]. By joining [facility]’s FollowMyHealth portal, you will also be able to view your health information using other applications (apps) compatible with our system.

## 2023-01-19 NOTE — DISCHARGE NOTE OB - CARE PROVIDER_API CALL
Derrek Recinos)  Obstetrics and Gynecology  [address] [place] [address]  Phone: [phone]  Fax: [phone]  Follow Up Time: 1 month

## 2023-01-19 NOTE — DISCHARGE NOTE OB - NS MD DC FALL RISK RISK
For information on Fall & Injury Prevention, visit: [email or web address] OR  [email or web address] OR  https://www.cdc.gov/steadi/patient.html

## 2023-01-19 NOTE — OB PROVIDER H&P - NSLOWPPHRISK_OBGYN_A_OB
No previous uterine incision/[place] Pregnancy/Less than or equal to 4 previous vaginal births/No known bleeding disorder/No history of postpartum hemorrhage/No other PPH risks indicated

## 2023-01-19 NOTE — DISCHARGE NOTE OB - PLAN OF CARE
REGULAR DIET  AMBULATION ENCOURAGED Regular diet. Resume normal activity as tolerated. Follow up in the office in 6 weeks for a postpartum visit. No heavy lifting, driving, or strenuous activity for 2 weeks. Nothing per vagina such as tampons, intercourse, douches or tub baths for 6 weeks or until you see your doctor. Call your doctor with any signs and symptoms of infection such as fever, chills, nausea or vomiting. Call your doctor if you're unable to tolerate food, increase in vaginal bleeding or have difficulty urinating. Call your doctor if you have pain that is not relieved by your prescribed medications. Notify your doctor with any other concerns.

## 2023-01-19 NOTE — OB RN TRIAGE NOTE - FALL HARM RISK - UNIVERSAL INTERVENTIONS
Bed in lowest position, wheels locked, appropriate side rails in place/Call bell, personal items and telephone in reach/Instruct patient to call for assistance before getting out of bed or chair/Non-slip footwear when patient is out of bed/[place] to call system/Physically safe environment - no spills, clutter or unnecessary equipment/Purposeful Proactive Rounding/Room/bathroom lighting operational, light cord in reach

## 2023-01-19 NOTE — OB PROVIDER TRIAGE NOTE - NSOBPROVIDERNOTE_OBGYN_ALL_OB_FT
plan d/w Dr. Meyers  patient to be re-examined in 2 hours.  will continue to monitor plan d/w Dr. Meyers  patient to be re-examined in 2 hours.  will continue to monitor    0545: Repeat VE 2.5/50/-3, irregular contractions on toco   plan d/w Dr. Meyers  patient to be admitted for early labor.  for epidural and Pitocin for augmentation

## 2023-01-19 NOTE — DISCHARGE NOTE OB - MATERIALS PROVIDED
Vaccinations/[facility]  Screening Program/  Immunization Record/Breastfeeding Log/Bottle Feeding Log/Breastfeeding Mother’s Support Group Information/Guide to Postpartum Care/[facility] Hearing Screen Program/Back To Sleep Handout/Shaken Baby Prevention Handout/Breastfeeding Guide and Packet/Birth Certificate Instructions/Tdap Vaccination (VIS Pub Date: 2012)

## 2023-01-19 NOTE — OB RN PATIENT PROFILE - FALL HARM RISK - UNIVERSAL INTERVENTIONS
Bed in lowest position, wheels locked, appropriate side rails in place/Call bell, personal items and telephone in reach/Instruct patient to call for assistance before getting out of bed or chair/Non-slip footwear when patient is out of bed/[place] to call system/Physically safe environment - no spills, clutter or unnecessary equipment/Purposeful Proactive Rounding/Room/bathroom lighting operational, light cord in reach

## 2023-01-19 NOTE — OB RN DELIVERY SUMMARY - NSSELHIDDEN_OBGYN_ALL_OB_FT
[NS_DeliveryAttending1_OBGYN_ALL_OB_FT:[id number]],[NS_DeliveryAssist1_OBGYN_ALL_OB_FT:[id number]],[NS_DeliveryRN_OBGYN_ALL_OB_FT:[id number]=]

## 2023-01-19 NOTE — OB PROVIDER TRIAGE NOTE - NSHPPHYSICALEXAM_GEN_ALL_CORE
Vital Signs Last 24 Hrs  T(C): 36.6 (19 Jan 2023 02:41), Max: 36.6 (19 Jan 2023 02:30)  T(F): 97.88 (19 Jan 2023 02:41), Max: 97.9 (19 Jan 2023 02:30)  HR: 65 (19 Jan 2023 02:49) (65 - 81)  BP: 111/71 (19 Jan 2023 02:49) (108/71 - 111/71)  BP(mean): --  RR: 16 (19 Jan 2023 02:30) (16 - 16)  SpO2: --    Assessment reveals VSS  General: Female sitting comfortably in no apparent distress  Neuro: No facial asymmetry, no slurred speech, moves all 4 extremities  Mood: Alert and lucid, appropriate mood and affect  Abdomen soft, NT, gravid  FHr 135 moderate variability with accels, no decels, ctx every 3-7 min   Transabdominal Ultrasound- cephalic   Vaginal Exam- 1.5/50/-3  A&Ox3  Lungs- clear bilateral  Heart- normal rate and regular rhythm  Extremities- Warm, Dry, no edema present, good pulses Vital Signs Last 24 Hrs  T(C): 36.6 (19 Jan 2023 02:41), Max: 36.6 (19 Jan 2023 02:30)  T(F): 97.88 (19 Jan 2023 02:41), Max: 97.9 (19 Jan 2023 02:30)  HR: 65 (19 Jan 2023 02:49) (65 - 81)  BP: 111/71 (19 Jan 2023 02:49) (108/71 - 111/71)  BP(mean): --  RR: 16 (19 Jan 2023 02:30) (16 - 16)  SpO2: --    Assessment reveals VSS  General: Female sitting comfortably in no apparent distress  Neuro: No facial asymmetry, no slurred speech, moves all 4 extremities  Mood: Alert and lucid, appropriate mood and affect  Abdomen soft, NT, gravid  FHr 135 moderate variability with accels, no decels, ctx every 3-7 min   Transabdominal Ultrasound- bpp 8/8, cephalic, posterior placenta, LAMAR 10.   Vaginal Exam- 1.5/50/-3  A&Ox3  Lungs- clear bilateral  Heart- normal rate and regular rhythm  Extremities- Warm, Dry, no edema present, good pulses

## 2023-01-19 NOTE — OB PROVIDER LABOR PROGRESS NOTE - NS_SUBJECTIVE/OBJECTIVE_OBGYN_ALL_OB_FT
Patient seen due to noted variable decelerations and examined for cervical dilation assessment. Epidural in place and effective. Patient has no complaints.    Vital Signs Last 24 Hrs  T(C): 37.0 (19 Jan 2023 08:29), Max: 37.0 (19 Jan 2023 08:29)  T(F): 98.6 (19 Jan 2023 08:29), Max: 98.6 (19 Jan 2023 08:29)  HR: 63 (19 Jan 2023 10:37) (63 - 106)  BP: 104/58 (19 Jan 2023 10:31) (100/60 - 132/65)  RR: 18 (19 Jan 2023 07:22) (16 - 20)  SpO2: 93% (19 Jan 2023 10:37) (91% - 100%)
Patient re-examined due to persistent variable decelerations

## 2023-01-19 NOTE — OB PROVIDER H&P - NSHPPHYSICALEXAM_GEN_ALL_CORE
Vital Signs Last 24 Hrs  T(C): 36.6 (19 Jan 2023 02:41), Max: 36.6 (19 Jan 2023 02:30)  T(F): 97.88 (19 Jan 2023 02:41), Max: 97.9 (19 Jan 2023 02:30)  HR: 65 (19 Jan 2023 02:49) (65 - 81)  BP: 111/71 (19 Jan 2023 02:49) (108/71 - 111/71)  BP(mean): --  RR: 16 (19 Jan 2023 02:30) (16 - 16)  SpO2: --    Assessment reveals VSS  General: Female sitting comfortably in no apparent distress  Neuro: No facial asymmetry, no slurred speech, moves all 4 extremities  Mood: Alert and lucid, appropriate mood and affect  Abdomen soft, NT, gravid  FHr 135 moderate variability with accels, no decels, ctx every 3-7 min   Transabdominal Ultrasound- bpp 8/8, cephalic, posterior placenta, LAMAR 10.   Vaginal Exam- 1.5/50/-3  A&Ox3  Lungs- clear bilateral  Heart- normal rate and regular rhythm  Extremities- Warm, Dry, no edema present, good pulses

## 2023-01-19 NOTE — OB PROVIDER H&P - NS_FETALPRESSONO_OBGYN_ALL_OB
----- Message from Jodi Castillo MD sent at 7/30/2018  7:44 AM CDT -----  RN, please call the mother to let her know that the phenobarbital and primidone levels are slightly low, but since they have been effective at this dose for Erla Fort, we do not need
See other TE 7/31/18.
Cephalic

## 2023-01-19 NOTE — OB NEONATOLOGY/PEDIATRICIAN DELIVERY SUMMARY - NSPEDSNEONOTESA_OBGYN_ALL_OB_FT
Peds called to LDR for category 2 FHR tracing. 40.1 female born via  to a 28 y/o  mother. No significant maternal or prenatal history. Maternal labs include Blood Type AB+ , HIV -, RPR NR , Rubella I , Hep B - , GBS-, COVID -. ROM at  0930 on  with clear fluids (1.5H).  Baby emerged vigorous, crying, was warmed, dried, suctioned, and stimulated with APGARS of 8/9. Nuchal x1. Mom plans to initiate breastfeeding, consents Hep B vaccine and consents declines circ.  Highest maternal temp 37. EOS 0.08.
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
## 2023-01-19 NOTE — OB PROVIDER DELIVERY SUMMARY - NSPROVIDERDELIVERYNOTE_OBGYN_ALL_OB_FT
Patient examined by Mahesh MILLS and Grisel CARPIO for category 2 FHT for intermittent decelerations. Found to be 10/100/0. Peds called to bedside for cat 2 tracing. Spontaneous vaginal delivery of liveborn female infant in CLARITZA position. Head delivered easily. Nuchal x1 noted and reduced. Shoulders and body delivered easily after. Compound presentation of posterior hand was noted. Infant was suctioned. No mec was noted. After one minute, the cord was clamped and cut. Infant was passed to awaiting peds. Placenta delivered intact with a 3 vessel cord noted. Uterine fundal massage and post partum pitocin were started. Uterine fundus was firm. Vaginal exam was performed and noted intact cervix, vaginal walls and sulci. Small first degree laceration was repaired with chromic suture. Excellent hemostasis was noted. Count was correct. Pt was stable after delivery.

## 2023-01-19 NOTE — OB PROVIDER DELIVERY SUMMARY - NSSELHIDDEN_OBGYN_ALL_OB_FT
[NS_DeliveryAttending1_OBGYN_ALL_OB_FT:[id number]],[NS_DeliveryAssist1_OBGYN_ALL_OB_FT:[id number]=]

## 2023-01-19 NOTE — OB PROVIDER LABOR PROGRESS NOTE - ASSESSMENT
27 y.o  @ 40w1d in labor, currently with category II tracing    patient making cervical change  repositioned to lateral side, then high fowlers with improvement in decels  consider IUPC/amnioinfusion if variables persist  Dr. Recinos made aware of FHR tracing    Deedee Aragon NP
making cervical change  Dr. Recinos now present at bedside  Patient repositioned, followed by persisted variables  re-examination by MD Recinos, fully dilated  to begin pushing with contractions    Deedee Aragon NP

## 2023-01-19 NOTE — DISCHARGE NOTE OB - CARE PLAN
1 Principal Discharge DX:	Normal vaginal delivery  Assessment and plan of treatment:	REGULAR DIET  AMBULATION ENCOURAGED   Principal Discharge DX:	Normal vaginal delivery  Assessment and plan of treatment:	Regular diet. Resume normal activity as tolerated. Follow up in the office in 6 weeks for a postpartum visit. No heavy lifting, driving, or strenuous activity for 2 weeks. Nothing per vagina such as tampons, intercourse, douches or tub baths for 6 weeks or until you see your doctor. Call your doctor with any signs and symptoms of infection such as fever, chills, nausea or vomiting. Call your doctor if you're unable to tolerate food, increase in vaginal bleeding or have difficulty urinating. Call your doctor if you have pain that is not relieved by your prescribed medications. Notify your doctor with any other concerns.

## 2023-01-19 NOTE — OB RN DELIVERY SUMMARY - NS_SEPSISRSKCALC_OBGYN_ALL_OB_FT
EOS calculated successfully. EOS Risk Factor: 0.5/1000 live births ([facility] [place] national incidence); GA=40w1d; Temp=98.6; ROM=1.617; GBS='Negative'; Antibiotics='No antibiotics or any antibiotics < 2 hrs prior to birth'

## 2023-01-20 VITALS
RESPIRATION RATE: 18 BRPM | SYSTOLIC BLOOD PRESSURE: 122 MMHG | TEMPERATURE: 98 F | OXYGEN SATURATION: 96 % | DIASTOLIC BLOOD PRESSURE: 70 MMHG | HEART RATE: 94 BPM

## 2023-01-20 RX ADMIN — Medication 975 MILLIGRAM(S): at 09:10

## 2023-01-20 RX ADMIN — Medication 600 MILLIGRAM(S): at 05:42

## 2023-01-20 RX ADMIN — Medication 975 MILLIGRAM(S): at 09:00

## 2023-01-20 RX ADMIN — Medication 600 MILLIGRAM(S): at 11:44

## 2023-01-20 NOTE — PROGRESS NOTE ADULT - SUBJECTIVE AND OBJECTIVE BOX
S: Patient doing well. Minimal lochia. Pain controlled.    O: Vital Signs Last 24 Hrs  T(C): 36.5 (2023 06:03), Max: 37.6 (2023 14:58)  T(F): 97.7 (2023 06:03), Max: 99.7 (2023 14:58)  HR: 59 (2023 06:03) (59 - 115)  BP: 110/67 (2023 06:03) (100/56 - 132/65)  BP(mean): --  RR: 18 (2023 06:03) (16 - 18)  SpO2: 100% (2023 06:03) (90% - 100%)    Parameters below as of 2023 22:00  Patient On (Oxygen Delivery Method): room air        Gen: NAD  Abd: soft, NT, ND, fundus firm below umbilicus  Lochia: moderate  Ext: no tenderness    Labs:                        11.1   8.06  )-----------( 187      ( 2023 06:55 )             33.8       A: 27y PPD# 1 s/p  doing well.  Plan: Routine care Dc per routine

## 2023-02-16 NOTE — OB PROVIDER LABOR PROGRESS NOTE - NSVAGINALEXAM_OBGYN_ALL_OB_DT
19-Jan-2023 10:25
19-Jan-2023 10:51
Wartpeel Counseling:  I discussed with the patient the risks of Wartpeel including but not limited to erythema, scaling, itching, weeping, crusting, and pain.

## 2023-04-20 NOTE — OB PROVIDER TRIAGE NOTE - NS_FETALPRESSONO_OBGYN_ALL_OB
Transverse Tranexamic Acid Pregnancy And Lactation Text: It is unknown if this medication is safe during pregnancy or breast feeding.

## 2023-07-25 NOTE — OB RN PATIENT PROFILE - BLOOD TRANSFUSION, PREVIOUS, PROFILE
ICC VISIT NOTE       Subjective   Patient Disposition:   Caroline is a 45 year old female and is being seen in our office for   Chief Complaint   Patient presents with   • Asthma     Pt arrives with c/o asthma exacerbation x 5 days. Pt reports using inhaler last around 0900. Pt reports running out of nebulizer last night. Pt denies any fevers. Pt is unable to get in to PCP.          HPI patient with an asthma exacerbation for the last 5 days.  Has run out of her nebulizer since 9 this morning--patient also has a cough productive of some yellow to green sputum.        MEDICATIONS:  Current Outpatient Medications   Medication Sig   • albuterol 108 (90 Base) MCG/ACT inhaler Inhale 2 puffs into the lungs every 6 hours as needed for Shortness of Breath or Wheezing.   • albuterol (VENTOLIN) (2.5 MG/3ML) 0.083% nebulizer solution Take 3 mLs by nebulization every 4 hours as needed for Wheezing.   • azithromycin (Zithromax) 500 MG tablet Take 1 tablet by mouth daily for 5 days.   • predniSONE (DELTASONE) 20 MG tablet Take 1 tablet by mouth daily for 7 days.   • albuterol 108 (90 Base) MCG/ACT inhaler INHALE 2 PUFFS INTO THE LUNGS EVERY 4 HOURS AS NEEDED FOR SHORTNESS OF BREATH OR WHEEZING   • montelukast (SINGULAIR) 10 MG tablet Take 1 tablet by mouth nightly.   • albuterol (VENTOLIN) (2.5 MG/3ML) 0.083% nebulizer solution Take 3 mLs by nebulization every 4 hours as needed for Wheezing or Shortness of Breath.   • Ascorbic Acid (VITAMIN C PO) Take 1 tablet by mouth daily.   • MAGNESIUM PO Take 1 tablet by mouth daily.   • Multiple Vitamin (MULTIVITAMIN ADULT PO) Take 1 tablet by mouth daily.   • Melatonin 10 MG Tab Take 10 mg by mouth daily.   • L-LYSINE PO Take 1 tablet by mouth daily.   • fluticasone-salmeterol 250-50 MCG/ACT inhaler Inhale 1 puff into the lungs in the morning and 1 puff in the evening.   • loratadine (CLARITIN) 10 MG tablet Take 10 mg by mouth daily.     No current facility-administered medications for  this visit.       ALLERGIES:  ALLERGIES:   Allergen Reactions   • Amoxicillin RASH       PAST MEDICAL HISTORY:  Past Medical History:   Diagnosis Date   • Allergy    • Depression    • Pneumonia     recent - pt states she is recovered   • RAD (reactive airway disease)    • Sinusitis, chronic        PAST SURGICAL HISTORY:  Past Surgical History:   Procedure Laterality Date   • Endometrial ablation     • Hernia repair      umbilical   • Ovary surgery      ovarian cyst       FAMILY HISTORY:  Family History   Problem Relation Age of Onset   • Heart disease Father    • Asthma Father    • ALS Father        SOCIAL HISTORY:  Social History     Tobacco Use   • Smoking status: Former     Current packs/day: 0.00     Types: Cigarettes     Quit date:      Years since quittin.5   • Smokeless tobacco: Never   • Tobacco comments:     quit 8/10/2021   Vaping Use   • Vaping Use: never used   Substance Use Topics   • Alcohol use: Not Currently   • Drug use: Not Currently     Types: Marijuana     Comment: edibles occas         Patient's medications, allergies, past medical, surgical, and social history  were reviewed and updated as appropriate.    REVIEW OF SYSTEMS  Review of Systems   Constitutional: Negative.    HENT: Negative.    Eyes: Negative.    Respiratory: Positive for cough and wheezing.    Cardiovascular: Negative.    Gastrointestinal: Negative.    Endocrine: Negative.    Genitourinary: Negative.    Musculoskeletal: Negative.    Skin: Negative.    Allergic/Immunologic: Negative.    Neurological: Negative.    Hematological: Negative.    Psychiatric/Behavioral: Negative.    All other systems reviewed and are negative.      Vitals:    23 1113   BP: 97/52   BP Location: RUE - Right upper extremity   Patient Position: Sitting   Cuff Size: Large Adult   Pulse: 88   Resp: 16   Temp: 97.5 °F (36.4 °C)   TempSrc: Oral   SpO2: 96%   Weight: 88.5 kg (195 lb)   Height: 5' 5\" (1.651 m)   PainSc:  0   LMP: 2022         POINT OF CARE TEST RESULTS  No results found for this visit on 07/25/23.      Objective     Physical Exam  Vitals and nursing note reviewed.   Constitutional:       General: She is not in acute distress.     Appearance: Normal appearance. She is not ill-appearing or diaphoretic.   HENT:      Head: Normocephalic and atraumatic.      Neck: Normal range of motion and neck supple. No muscular tenderness.   Eyes:      Extraocular Movements: Extraocular movements intact.      Conjunctiva/sclera: Conjunctivae normal.      Pupils: Pupils are equal, round, and reactive to light.   Cardiovascular:      Rate and Rhythm: Normal rate and regular rhythm.   Pulmonary:      Effort: Pulmonary effort is normal. No respiratory distress.      Comments: There is good air exchange with scattered wheezes in all lung fields  Musculoskeletal:         General: No tenderness or signs of injury. Normal range of motion.   Skin:     General: Skin is warm and dry.      Findings: No rash.   Neurological:      General: No focal deficit present.      Mental Status: She is alert and oriented to person, place, and time.   Psychiatric:         Mood and Affect: Mood normal.         Behavior: Behavior normal.           Assessment   Problem List Items Addressed This Visit    None  Visit Diagnoses     Mild persistent asthma with exacerbation    -  Primary    Relevant Medications    ipratropium-albuterol (DUONEB) 0.5-2.5 (3) MG/3ML nebulizer solution 3 mL (Completed)    predniSONE (DELTASONE) tablet 40 mg (Completed)    albuterol 108 (90 Base) MCG/ACT inhaler    albuterol (VENTOLIN) (2.5 MG/3ML) 0.083% nebulizer solution    azithromycin (Zithromax) 500 MG tablet    predniSONE (DELTASONE) 20 MG tablet          (J45.31) Mild persistent asthma with exacerbation  (primary encounter diagnosis)  Plan: ipratropium-albuterol (DUONEB) 0.5-2.5 (3)         MG/3ML nebulizer solution 3 mL, predniSONE         (DELTASONE) tablet 40 mg, albuterol 108 (90         Base)  MCG/ACT inhaler, albuterol (VENTOLIN)         (2.5 MG/3ML) 0.083% nebulizer solution,         azithromycin (Zithromax) 500 MG tablet,         predniSONE (DELTASONE) 20 MG tablet      New Prescriptions    ALBUTEROL (VENTOLIN) (2.5 MG/3ML) 0.083% NEBULIZER SOLUTION    Take 3 mLs by nebulization every 4 hours as needed for Wheezing.    ALBUTEROL 108 (90 BASE) MCG/ACT INHALER    Inhale 2 puffs into the lungs every 6 hours as needed for Shortness of Breath or Wheezing.    AZITHROMYCIN (ZITHROMAX) 500 MG TABLET    Take 1 tablet by mouth daily for 5 days.    PREDNISONE (DELTASONE) 20 MG TABLET    Take 1 tablet by mouth daily for 7 days.       Follow-up Information     Follow up With Specialties Details Why Contact Info    Iona Chambers,  Family Practice Follow up in 1 week(s) Asthma flare.  We will treat with albuterol prednisone Zithromax bonded well to DuoNeb here, If symptoms worsen, For recheck [address] [place] [address] [phone]          After DuoNeb patient feels much better and her wheezing is much diminished    FOLLOW UP  Please follow up with your PCP Iona Chambers, DO or proceed to ER if symptoms persist or worsen.      Instructions provided as documented in the AVS.     no

## 2023-10-11 NOTE — OB RN TRIAGE NOTE - NS_MODEOFARRIVAL_OBGYN_ALL_OB
Today you saw nurse Khadijah Mauricio with the [facility] program.  Contact her with questions or concerns at [phone].    The phone number for Dr. Elvia Cardona is [phone].      Want to Say “Thank You” to a Nurse?  The CHRISTIE Award® was created in memory of JENSEN Castellanos by his family to say thank you to bedside nurses who provide an outstanding level of care.  Submit a nomination using any method below.     OR    [email or web address]       Car

## 2024-05-28 ENCOUNTER — APPOINTMENT (OUTPATIENT)
Dept: ANTEPARTUM | Facility: CLINIC | Age: 29
End: 2024-05-28
Payer: MEDICAID

## 2024-05-28 ENCOUNTER — ASOB RESULT (OUTPATIENT)
Age: 29
End: 2024-05-28

## 2024-05-28 PROCEDURE — 76813 OB US NUCHAL MEAS 1 GEST: CPT

## 2024-05-28 PROCEDURE — 76801 OB US < 14 WKS SINGLE FETUS: CPT

## 2024-06-04 NOTE — OB PROVIDER TRIAGE NOTE - NSOBPROVIDERNOTE_OBGYN_ALL_OB_FT
Health Maintenance   Topic Date Due    Lipid Panel  Never done    Hepatitis C Screening  06/08/2030 (Originally 2006)    TETANUS VACCINE  08/01/2027    DTaP/Tdap/Td Vaccines (7 - Td or Tdap) 08/01/2027    Hepatitis B Vaccines  Completed    IPV Vaccines  Completed    Hepatitis A Vaccines  Completed    MMR Vaccines  Completed    Varicella Vaccines  Completed    Meningococcal Vaccine  Completed    HPV Vaccines  Completed    Attending school in [facility] [employer] [facility]. Dentist Dr Solares, eye Dr Smith.    Evidence of labor     d/w Dr. Wilcox     -Admit to L&D for expectant management   -Routine orders placed   -Approved for epidural

## 2024-07-16 ENCOUNTER — APPOINTMENT (OUTPATIENT)
Dept: ANTEPARTUM | Facility: CLINIC | Age: 29
End: 2024-07-16
Payer: MEDICAID

## 2024-07-16 ENCOUNTER — ASOB RESULT (OUTPATIENT)
Age: 29
End: 2024-07-16

## 2024-07-16 PROCEDURE — 76805 OB US >/= 14 WKS SNGL FETUS: CPT

## 2024-08-12 NOTE — OB PROVIDER TRIAGE NOTE - NS_RISKASSESSMENT_OBGYN_ALL_OB
Goal Outcome Evaluation:       Pt appeared to sleep 6 hours during the night, no problems reported, no PRN's given on Status 15 minute safety checks.                  No

## 2024-08-23 NOTE — OB PROVIDER H&P - ABORTIONS, OB PROFILE
Detail Level: Detailed
Quality 110: Preventive Care And Screening: Influenza Immunization: Influenza Immunization not Administered because Patient Refused.
0

## 2024-11-04 ENCOUNTER — NON-APPOINTMENT (OUTPATIENT)
Age: 29
End: 2024-11-04

## 2024-11-04 VITALS — HEIGHT: 67 IN

## 2024-11-04 DIAGNOSIS — Z78.9 OTHER SPECIFIED HEALTH STATUS: ICD-10-CM

## 2024-11-04 DIAGNOSIS — G43.909 MIGRAINE, UNSPECIFIED, NOT INTRACTABLE, W/OUT STATUS MIGRAINOSUS: ICD-10-CM

## 2024-11-04 DIAGNOSIS — Z98.890 OTHER SPECIFIED POSTPROCEDURAL STATES: ICD-10-CM

## 2024-11-04 DIAGNOSIS — Z34.90 ENCOUNTER FOR SUPERVISION OF NORMAL PREGNANCY, UNSPECIFIED, UNSPECIFIED TRIMESTER: ICD-10-CM

## 2024-11-06 ENCOUNTER — APPOINTMENT (OUTPATIENT)
Facility: CLINIC | Age: 29
End: 2024-11-06

## 2024-11-07 ENCOUNTER — APPOINTMENT (OUTPATIENT)
Facility: CLINIC | Age: 29
End: 2024-11-07

## 2024-11-07 PROCEDURE — 0502F SUBSEQUENT PRENATAL CARE: CPT

## 2024-11-07 PROCEDURE — 99213 OFFICE O/P EST LOW 20 MIN: CPT

## 2024-11-14 ENCOUNTER — APPOINTMENT (OUTPATIENT)
Facility: CLINIC | Age: 29
End: 2024-11-14

## 2024-11-20 ENCOUNTER — APPOINTMENT (OUTPATIENT)
Facility: CLINIC | Age: 29
End: 2024-11-20

## 2024-11-20 PROCEDURE — 0502F SUBSEQUENT PRENATAL CARE: CPT

## 2024-11-20 PROCEDURE — 99213 OFFICE O/P EST LOW 20 MIN: CPT

## 2024-11-27 ENCOUNTER — APPOINTMENT (OUTPATIENT)
Facility: CLINIC | Age: 29
End: 2024-11-27

## 2024-11-27 PROCEDURE — 99213 OFFICE O/P EST LOW 20 MIN: CPT

## 2024-11-27 PROCEDURE — 0502F SUBSEQUENT PRENATAL CARE: CPT

## 2024-11-30 ENCOUNTER — ASOB RESULT (OUTPATIENT)
Age: 29
End: 2024-11-30

## 2024-11-30 ENCOUNTER — APPOINTMENT (OUTPATIENT)
Dept: ANTEPARTUM | Facility: CLINIC | Age: 29
End: 2024-11-30
Payer: MEDICAID

## 2024-11-30 ENCOUNTER — INPATIENT (INPATIENT)
Facility: HOSPITAL | Age: 29
LOS: 1 days | Discharge: HOME | End: 2024-12-02
Attending: SPECIALIST | Admitting: SPECIALIST
Payer: MEDICAID

## 2024-11-30 ENCOUNTER — TRANSCRIPTION ENCOUNTER (OUTPATIENT)
Age: 29
End: 2024-11-30

## 2024-11-30 VITALS
DIASTOLIC BLOOD PRESSURE: 74 MMHG | SYSTOLIC BLOOD PRESSURE: 114 MMHG | HEART RATE: 85 BPM | TEMPERATURE: 98 F | RESPIRATION RATE: 16 BRPM

## 2024-11-30 DIAGNOSIS — O26.899 OTHER SPECIFIED PREGNANCY RELATED CONDITIONS, UNSPECIFIED TRIMESTER: ICD-10-CM

## 2024-11-30 DIAGNOSIS — O36.8130 DECREASED FETAL MOVEMENTS, THIRD TRIMESTER, NOT APPLICABLE OR UNSPECIFIED: ICD-10-CM

## 2024-11-30 DIAGNOSIS — Z98.890 OTHER SPECIFIED POSTPROCEDURAL STATES: Chronic | ICD-10-CM

## 2024-11-30 LAB
ALBUMIN SERPL ELPH-MCNC: 2.8 G/DL — LOW (ref 3.3–5)
ALP SERPL-CCNC: 224 U/L — HIGH (ref 40–120)
ALT FLD-CCNC: 13 U/L — SIGNIFICANT CHANGE UP (ref 4–33)
ANION GAP SERPL CALC-SCNC: 12 MMOL/L — SIGNIFICANT CHANGE UP (ref 7–14)
AST SERPL-CCNC: 16 U/L — SIGNIFICANT CHANGE UP (ref 4–32)
BASOPHILS # BLD AUTO: 0.02 K/UL — SIGNIFICANT CHANGE UP (ref 0–0.2)
BASOPHILS NFR BLD AUTO: 0.3 % — SIGNIFICANT CHANGE UP (ref 0–2)
BILIRUB SERPL-MCNC: <0.2 MG/DL — SIGNIFICANT CHANGE UP (ref 0.2–1.2)
BLD GP AB SCN SERPL QL: NEGATIVE — SIGNIFICANT CHANGE UP
BUN SERPL-MCNC: 9 MG/DL — SIGNIFICANT CHANGE UP (ref 7–23)
CALCIUM SERPL-MCNC: 8.5 MG/DL — SIGNIFICANT CHANGE UP (ref 8.4–10.5)
CHLORIDE SERPL-SCNC: 106 MMOL/L — SIGNIFICANT CHANGE UP (ref 98–107)
CO2 SERPL-SCNC: 17 MMOL/L — LOW (ref 22–31)
CREAT SERPL-MCNC: 0.43 MG/DL — LOW (ref 0.5–1.3)
EGFR: 134 ML/MIN/1.73M2 — SIGNIFICANT CHANGE UP
EOSINOPHIL # BLD AUTO: 0.02 K/UL — SIGNIFICANT CHANGE UP (ref 0–0.5)
EOSINOPHIL NFR BLD AUTO: 0.3 % — SIGNIFICANT CHANGE UP (ref 0–6)
GLUCOSE SERPL-MCNC: 92 MG/DL — SIGNIFICANT CHANGE UP (ref 70–99)
HCT VFR BLD CALC: 33.6 % — LOW (ref 34.5–45)
HCT VFR BLD CALC: 36.2 % — SIGNIFICANT CHANGE UP (ref 34.5–45)
HGB BLD-MCNC: 11 G/DL — LOW (ref 11.5–15.5)
HGB BLD-MCNC: 12.1 G/DL — SIGNIFICANT CHANGE UP (ref 11.5–15.5)
IANC: 5.13 K/UL — SIGNIFICANT CHANGE UP (ref 1.8–7.4)
IMM GRANULOCYTES NFR BLD AUTO: 0.5 % — SIGNIFICANT CHANGE UP (ref 0–0.9)
LYMPHOCYTES # BLD AUTO: 1.79 K/UL — SIGNIFICANT CHANGE UP (ref 1–3.3)
LYMPHOCYTES # BLD AUTO: 23.8 % — SIGNIFICANT CHANGE UP (ref 13–44)
MAGNESIUM SERPL-MCNC: 1.6 MG/DL — SIGNIFICANT CHANGE UP (ref 1.6–2.6)
MCHC RBC-ENTMCNC: 30.8 PG — SIGNIFICANT CHANGE UP (ref 27–34)
MCHC RBC-ENTMCNC: 31.3 PG — SIGNIFICANT CHANGE UP (ref 27–34)
MCHC RBC-ENTMCNC: 32.7 G/DL — SIGNIFICANT CHANGE UP (ref 32–36)
MCHC RBC-ENTMCNC: 33.4 G/DL — SIGNIFICANT CHANGE UP (ref 32–36)
MCV RBC AUTO: 93.5 FL — SIGNIFICANT CHANGE UP (ref 80–100)
MCV RBC AUTO: 94.1 FL — SIGNIFICANT CHANGE UP (ref 80–100)
MONOCYTES # BLD AUTO: 0.51 K/UL — SIGNIFICANT CHANGE UP (ref 0–0.9)
MONOCYTES NFR BLD AUTO: 6.8 % — SIGNIFICANT CHANGE UP (ref 2–14)
NEUTROPHILS # BLD AUTO: 5.13 K/UL — SIGNIFICANT CHANGE UP (ref 1.8–7.4)
NEUTROPHILS NFR BLD AUTO: 68.3 % — SIGNIFICANT CHANGE UP (ref 43–77)
NRBC # BLD: 0 /100 WBCS — SIGNIFICANT CHANGE UP (ref 0–0)
NRBC # BLD: 0 /100 WBCS — SIGNIFICANT CHANGE UP (ref 0–0)
NRBC # FLD: 0 K/UL — SIGNIFICANT CHANGE UP (ref 0–0)
NRBC # FLD: 0 K/UL — SIGNIFICANT CHANGE UP (ref 0–0)
PHOSPHATE SERPL-MCNC: 3.4 MG/DL — SIGNIFICANT CHANGE UP (ref 2.5–4.5)
PLATELET # BLD AUTO: 178 K/UL — SIGNIFICANT CHANGE UP (ref 150–400)
PLATELET # BLD AUTO: 207 K/UL — SIGNIFICANT CHANGE UP (ref 150–400)
POTASSIUM SERPL-MCNC: 3.9 MMOL/L — SIGNIFICANT CHANGE UP (ref 3.5–5.3)
POTASSIUM SERPL-SCNC: 3.9 MMOL/L — SIGNIFICANT CHANGE UP (ref 3.5–5.3)
PROT SERPL-MCNC: 5.3 G/DL — LOW (ref 6–8.3)
RBC # BLD: 3.57 M/UL — LOW (ref 3.8–5.2)
RBC # BLD: 3.87 M/UL — SIGNIFICANT CHANGE UP (ref 3.8–5.2)
RBC # FLD: 13.3 % — SIGNIFICANT CHANGE UP (ref 10.3–14.5)
RBC # FLD: 13.5 % — SIGNIFICANT CHANGE UP (ref 10.3–14.5)
RH IG SCN BLD-IMP: POSITIVE — SIGNIFICANT CHANGE UP
SODIUM SERPL-SCNC: 135 MMOL/L — SIGNIFICANT CHANGE UP (ref 135–145)
T4 FREE SERPL-MCNC: 0.9 NG/DL — SIGNIFICANT CHANGE UP (ref 0.9–1.8)
TSH SERPL-MCNC: 5.29 UIU/ML — HIGH (ref 0.27–4.2)
WBC # BLD: 7.02 K/UL — SIGNIFICANT CHANGE UP (ref 3.8–10.5)
WBC # BLD: 7.51 K/UL — SIGNIFICANT CHANGE UP (ref 3.8–10.5)
WBC # FLD AUTO: 7.02 K/UL — SIGNIFICANT CHANGE UP (ref 3.8–10.5)
WBC # FLD AUTO: 7.51 K/UL — SIGNIFICANT CHANGE UP (ref 3.8–10.5)

## 2024-11-30 PROCEDURE — 93010 ELECTROCARDIOGRAM REPORT: CPT | Mod: 76

## 2024-11-30 PROCEDURE — 76815 OB US LIMITED FETUS(S): CPT | Mod: 26

## 2024-11-30 PROCEDURE — 59410 OBSTETRICAL CARE: CPT | Mod: U9

## 2024-11-30 RX ORDER — WITCH HAZEL 50 G/100ML
1 SOLUTION RECTAL EVERY 4 HOURS
Refills: 0 | Status: DISCONTINUED | OUTPATIENT
Start: 2024-11-30 | End: 2024-12-02

## 2024-11-30 RX ORDER — ACETAMINOPHEN 500MG 500 MG/1
975 TABLET, COATED ORAL
Refills: 0 | Status: DISCONTINUED | OUTPATIENT
Start: 2024-11-30 | End: 2024-12-02

## 2024-11-30 RX ORDER — DIBUCAINE 1 %
1 OINTMENT (GRAM) TOPICAL EVERY 6 HOURS
Refills: 0 | Status: DISCONTINUED | OUTPATIENT
Start: 2024-11-30 | End: 2024-12-02

## 2024-11-30 RX ORDER — .BETA.-CAROTENE, SODIUM ACETATE, ASCORBIC ACID, CHOLECALCIFEROL, .ALPHA.-TOCOPHEROL ACETATE, DL-, THIAMINE MONONITRATE, RIBOFLAVIN, NIACINAMIDE, PYRIDOXINE HYDROCHLORIDE, FOLIC ACID, CYANOCOBALAMIN, CALCIUM CARBONATE, FERROUS FUMARATE, ZINC OXIDE AND CUPRIC OXIDE 2000; 2000; 120; 400; 22; 1.84; 3; 20; 10; 1; 12; 200; 27; 25; 2 [IU]/1; [IU]/1; MG/1; [IU]/1; MG/1; MG/1; MG/1; MG/1; MG/1; MG/1; UG/1; MG/1; MG/1; MG/1; MG/1
1 TABLET ORAL DAILY
Refills: 0 | Status: DISCONTINUED | OUTPATIENT
Start: 2024-11-30 | End: 2024-12-02

## 2024-11-30 RX ORDER — HYDROCORTISONE BUTYRATE 0.1 %
1 CREAM (GRAM) TOPICAL EVERY 6 HOURS
Refills: 0 | Status: DISCONTINUED | OUTPATIENT
Start: 2024-11-30 | End: 2024-12-02

## 2024-11-30 RX ORDER — INFLUENZA VIRUS VACCINE 15; 15; 15; 15 UG/.5ML; UG/.5ML; UG/.5ML; UG/.5ML
0.5 SUSPENSION INTRAMUSCULAR ONCE
Refills: 0 | Status: DISCONTINUED | OUTPATIENT
Start: 2024-11-30 | End: 2024-12-02

## 2024-11-30 RX ORDER — OXYCODONE HYDROCHLORIDE 30 MG/1
5 TABLET ORAL ONCE
Refills: 0 | Status: DISCONTINUED | OUTPATIENT
Start: 2024-11-30 | End: 2024-12-02

## 2024-11-30 RX ORDER — LANOLIN 72 %
1 OINTMENT (GRAM) TOPICAL EVERY 6 HOURS
Refills: 0 | Status: DISCONTINUED | OUTPATIENT
Start: 2024-11-30 | End: 2024-12-02

## 2024-11-30 RX ORDER — KETOROLAC TROMETHAMINE 30 MG/ML
30 INJECTION INTRAMUSCULAR; INTRAVENOUS ONCE
Refills: 0 | Status: DISCONTINUED | OUTPATIENT
Start: 2024-11-30 | End: 2024-11-30

## 2024-11-30 RX ORDER — TETANUS TOXOID, REDUCED DIPHTHERIA TOXOID AND ACELLULAR PERTUSSIS VACCINE, ADSORBED 5; 2.5; 8; 8; 2.5 [IU]/.5ML; [IU]/.5ML; UG/.5ML; UG/.5ML; UG/.5ML
0.5 SUSPENSION INTRAMUSCULAR ONCE
Refills: 0 | Status: DISCONTINUED | OUTPATIENT
Start: 2024-11-30 | End: 2024-12-02

## 2024-11-30 RX ORDER — OXYCODONE HYDROCHLORIDE 30 MG/1
5 TABLET ORAL
Refills: 0 | Status: DISCONTINUED | OUTPATIENT
Start: 2024-11-30 | End: 2024-12-02

## 2024-11-30 RX ORDER — SIMETHICONE 125 MG
80 CAPSULE ORAL EVERY 4 HOURS
Refills: 0 | Status: DISCONTINUED | OUTPATIENT
Start: 2024-11-30 | End: 2024-12-02

## 2024-11-30 RX ORDER — IBUPROFEN 200 MG
1 TABLET ORAL
Qty: 0 | Refills: 0 | DISCHARGE
Start: 2024-11-30

## 2024-11-30 RX ORDER — 0.9 % SODIUM CHLORIDE 0.9 %
1000 INTRAVENOUS SOLUTION INTRAVENOUS
Refills: 0 | Status: DISCONTINUED | OUTPATIENT
Start: 2024-11-30 | End: 2024-11-30

## 2024-11-30 RX ORDER — ACETAMINOPHEN 500MG 500 MG/1
3 TABLET, COATED ORAL
Qty: 0 | Refills: 0 | DISCHARGE
Start: 2024-11-30

## 2024-11-30 RX ORDER — PRAMOXINE HYDROCHLORIDE 1 MG/ML
1 LOTION TOPICAL EVERY 4 HOURS
Refills: 0 | Status: DISCONTINUED | OUTPATIENT
Start: 2024-11-30 | End: 2024-12-02

## 2024-11-30 RX ORDER — DIPHENHYDRAMINE HCL 25 MG
25 CAPSULE ORAL EVERY 6 HOURS
Refills: 0 | Status: DISCONTINUED | OUTPATIENT
Start: 2024-11-30 | End: 2024-12-02

## 2024-11-30 RX ORDER — SODIUM CHLORIDE 9 MG/ML
3 INJECTION, SOLUTION INTRAMUSCULAR; INTRAVENOUS; SUBCUTANEOUS EVERY 8 HOURS
Refills: 0 | Status: DISCONTINUED | OUTPATIENT
Start: 2024-11-30 | End: 2024-12-02

## 2024-11-30 RX ORDER — BENZOCAINE 10 %
1 OINTMENT (GRAM) TOPICAL EVERY 6 HOURS
Refills: 0 | Status: DISCONTINUED | OUTPATIENT
Start: 2024-11-30 | End: 2024-12-02

## 2024-11-30 RX ORDER — IBUPROFEN 200 MG
600 TABLET ORAL EVERY 6 HOURS
Refills: 0 | Status: COMPLETED | OUTPATIENT
Start: 2024-11-30 | End: 2025-10-29

## 2024-11-30 RX ORDER — 0.9 % SODIUM CHLORIDE 0.9 %
1000 INTRAVENOUS SOLUTION INTRAVENOUS ONCE
Refills: 0 | Status: COMPLETED | OUTPATIENT
Start: 2024-11-30 | End: 2024-11-30

## 2024-11-30 RX ORDER — CHLORHEXIDINE GLUCONATE 1.2 MG/ML
1 RINSE ORAL DAILY
Refills: 0 | Status: DISCONTINUED | OUTPATIENT
Start: 2024-11-30 | End: 2024-11-30

## 2024-11-30 RX ADMIN — KETOROLAC TROMETHAMINE 30 MILLIGRAM(S): 30 INJECTION INTRAMUSCULAR; INTRAVENOUS at 21:00

## 2024-11-30 RX ADMIN — KETOROLAC TROMETHAMINE 30 MILLIGRAM(S): 30 INJECTION INTRAMUSCULAR; INTRAVENOUS at 23:39

## 2024-11-30 RX ADMIN — Medication 2000 MILLILITER(S): at 16:21

## 2024-11-30 RX ADMIN — Medication 6 MILLIUNIT(S)/MIN: at 19:10

## 2024-11-30 RX ADMIN — SODIUM CHLORIDE 3 MILLILITER(S): 9 INJECTION, SOLUTION INTRAMUSCULAR; INTRAVENOUS; SUBCUTANEOUS at 21:00

## 2024-11-30 NOTE — OB RN PATIENT PROFILE - FALL HARM RISK - UNIVERSAL INTERVENTIONS
Bed in lowest position, wheels locked, appropriate side rails in place/Call bell, personal items and telephone in reach/Instruct patient to call for assistance before getting out of bed or chair/Non-slip footwear when patient is out of bed/[place] to call system/Physically safe environment - no spills, clutter or unnecessary equipment/Purposeful Proactive Rounding/Room/bathroom lighting operational, light cord in reach

## 2024-11-30 NOTE — OB RN PATIENT PROFILE - NSLDARRIVAL_OBGYN_ALL_OB_START_DATE
Points to R&L costal margin which is T6-7 (not T3). Next steps: #1= thoracic CT, #2= to NeuroSurgery (group of Dr Burger/[place]elia)   30-Nov-2024 15:26

## 2024-11-30 NOTE — OB PROVIDER H&P - IN ACCORDANCE WITH [place] STATE LAW, WE OFFER EVERY PATIENT A HEPATITIS C TEST. WOULD YOU LIKE TO BE TESTED TODAY?
----- Message from Jo Alex sent at 3/20/2024 10:29 AM CDT -----  Contact: self  Type: Needs Medical Advice  Who Called:  pt  Best Call Back Number: [phone]   Additional Information: pt would like to have orders for mammo.please call when done so pt can schedule       
Previously negative

## 2024-11-30 NOTE — DISCHARGE NOTE OB - NS MD DC FALL RISK RISK
For information on Fall & Injury Prevention, visit: [email or web address] OR  [email or web address] OR  https://www.cdc.gov/steadi/patient.html

## 2024-11-30 NOTE — DISCHARGE NOTE OB - CARE PROVIDER_API CALL
Derrek Recinos.  Obstetrics and Gynecology  [address] [place] [address]  Phone: [phone]  Fax: [phone]  Follow Up Time:

## 2024-11-30 NOTE — OB PROVIDER H&P - NSHPPHYSICALEXAM_GEN_ALL_CORE
ICU Vital Signs Last 24 Hrs  T(C): 36.8 (30 Nov 2024 15:32), Max: 36.8 (30 Nov 2024 15:32)  T(F): 98.2 (30 Nov 2024 15:32), Max: 98.2 (30 Nov 2024 15:32)  HR: 84 (30 Nov 2024 16:20) (84 - 85)  BP: 100/60 (30 Nov 2024 16:20) (100/60 - 114/74)  BP(mean): --  ABP: --  ABP(mean): --  RR: 16 (30 Nov 2024 15:32) (16 - 16)  SpO2: --    General: Female sitting comfortably   Neuro: No facial asymmetry, no slurred speech, moves all 4 extremities  Mood: Alert and lucid, appropriate mood and affect  Head: Normocephalic. Atraumatic.   Eyes: No discharge, lids normal, conjunctiva normal  Lungs: No respiratory distress  Abdomen: Soft, nontender. Gravid. No contractions on palpation    NST reactive but cat II with fetal tachycardia with 170 baseline with mod variability; infrequent ctx's  SSE- negative pooling/ negative Nitrazine/ negative ferning  VE-5/70/-2  GBS negative as of 10/30/2024  EFW~3515g

## 2024-11-30 NOTE — OB RN DELIVERY SUMMARY - NSSELHIDDEN_OBGYN_ALL_OB_FT
[NS_DeliveryAttending1_OBGYN_ALL_OB_FT:[id number]],[NS_DeliveryAssist1_OBGYN_ALL_OB_FT:[id number]],[NS_DeliveryRN_OBGYN_ALL_OB_FT:[id number]]
unaffiliated

## 2024-11-30 NOTE — OB PROVIDER H&P - ASSESSMENT
28yo female  @ 39.6 wks SLIUP uncomplicated PNC here with decreased FM with cat II NST  -pt was admitted and discussed with Dr Bell  -Dr Bell in room to see and [place] pt  -IVLR bolus ordered  -GBS negative  -pt to get epidural then be AROM'ed  -Risks and benefits, alternatives, and possible complications have been discussed in detail with pt in her native language. Preadmission admission , and post admission procedures and expectations were discussed in detail. All questions answered, all appropriate hospital consents were signed. Anticipate normal vaginal delivery.  -Informed consent was obtained. The following were discussed:    -induction/augmentation of labor: use of medication and/or cook balloon to begin or enhance labor    -obstetrical management including internal fetal/contraction monitoring

## 2024-11-30 NOTE — DISCHARGE NOTE OB - FINANCIAL ASSISTANCE
[facility] provides services at a reduced cost to those who are determined to be eligible through [facility]’s financial assistance program. Information regarding [facility]’s financial assistance program can be found by going to [email or web address] or by calling [phone].

## 2024-11-30 NOTE — OB RN PATIENT PROFILE - BREASTFEEDING MOTHER TAUGHT HOW TO HAND EXPRESS THEIR OWN MILK
HPI: Psych/Substance Abuse


Time Seen by Provider: 11/05/18 18:55


Chief Complaint (Nursing): Psychiatric Evaluation


Chief Complaint (Provider): Psychiatric Evaluation


History Per: Patient


History/Exam Limitations: no limitations


Onset/Duration Of Symptoms: Hrs


Current Symptoms Are (Timing): Still Present


Modifying Factor(s): Alcohol


Associated Symptoms: Agitation


Additional History Per: EMS


Additional Complaint(s): 


36 year old male with a history of psychiatric illness and substance abuse pre

sents to the ER for psychiatric evaluation. As per EMS, mom called ambulance due

to patients agitated behavior at home. Patient admits to drinking beer. Denies 

homicidal ideation or suicidal ideation.  











Past Medical History


Reviewed: Historical Data, Nursing Documentation, Vital Signs


Vital Signs: 





                                Last Vital Signs











Temp  98.0 F   11/05/18 18:55


 


Pulse  95 H  11/05/18 18:55


 


Resp  16   11/05/18 18:55


 


BP  144/88   11/05/18 18:55


 


Pulse Ox  99   11/05/18 18:55














- Medical History


PMH: Anxiety, Bipolar Disorder, HTN, Hypercholesterolemia


   Denies: Diabetes, Hepatitis, HIV, Seizures, Sexually Transmitted Disease





- Family History


Family History: States: Unknown Family Hx





- Social History


Alcohol: < 2 Drinks/Day





- Immunization History


Hx Tetanus Toxoid Vaccination: No


Hx Influenza Vaccination: No


Hx Pneumococcal Vaccination: No





- Home Medications


Home Medications: 


                                Ambulatory Orders











 Medication  Instructions  Recorded


 


Clonazepam 0.5 mg PO HS 06/14/18


 


Icosapent Ethyl [Vascepa] 2 gm PO Q12 06/14/18


 


Losartan/Hydrochlorothiazide 1 tab PO DAILY 06/14/18





[Hyzaar 100-12.5 Tablet]  


 


Risperidone [Risperdal] 2 mg PO DAILY 06/14/18


 


amLODIPine [Norvasc] 10 mg PO DAILY 06/14/18


 


Folic Acid 1 mg PO DAILY #30 tab 06/19/18


 


Lidocaine 2% Viscous 20 ml MM Q8 #1 bottle 06/19/18


 


Thiamine [Vitamin B1 Tab] 100 mg PO DAILY #30 tab 06/19/18














- Allergies


Allergies/Adverse Reactions: 


                                    Allergies











Allergy/AdvReac Type Severity Reaction Status Date / Time


 


No Known Allergies Allergy   Verified 11/05/18 18:55














Review of Systems


ROS Statement: Except As Marked, All Systems Reviewed And Found Negative


Constitutional: Negative for: Fever, Chills


Psych: Positive for: Other (Agitated ).  Negative for: Suicidal ideation 

(homcidal ideation )





Physical Exam





- Reviewed


Nursing Documentation Reviewed: Yes


Vital Signs Reviewed: Yes





- Physical Exam


Appears: Positive for: Non-toxic, No Acute Distress


Head Exam: Positive for: ATRAUMATIC, NORMAL INSPECTION, NORMOCEPHALIC


Skin: Positive for: Normal Color, Warm, Dry.  Negative for: Rash


Eye Exam: Positive for: EOMI, Normal appearance, PERRL


ENT: Positive for: Normal ENT Inspection


Neck: Positive for: Normal, Painless ROM, Supple.  Negative for: Decreased ROM


Cardiovascular/Chest: Positive for: Regular Rate, Rhythm.  Negative for: Murmur


Respiratory: Positive for: Normal Breath Sounds.  Negative for: Decreased Breath

Sounds, Wheezing, Respiratory Distress


Gastrointestinal/Abdominal: Positive for: Normal Exam, Soft.  Negative for: 

Tenderness, Guarding, Rebound


Back: Positive for: Normal Inspection


Extremity: Positive for: Normal ROM.  Negative for: Tenderness, Pedal Edema, De

formity


Neurologic/Psych: Positive for: Alert, Oriented (x3), Mood/Affect (patient 

mildly agitated but can be redirected).  Negative for: Motor/Sensory Deficits





- Laboratory Results


Result Diagrams: 


                                 11/05/18 21:29





                                 11/05/18 21:29





- ECG


O2 Sat by Pulse Oximetry: 99 (RA)


Pulse Ox Interpretation: Normal





Medical Decision Making


Medical Decision Making: 


Time: 1901


Initial Plan:


Alcohol Serum 


BMP


Drug screen


CBC w/ Differential 


Urinalysis 


Reevaluation    





Patient will be placed on 1:1 observation 





----------------------

---------------------------------------------------------------------------   


Scribe Attestation:


Documented by Devorah Estes, acting as a scribe for Patricio Fraser PA-C





Provider Scribe Attestation:


All medical record entries made by the Scribe were at my direction and person

ally dictated by me. I have reviewed the chart and agree that the record 

accurately reflects my personal performance of the history, physical exam, 

medical decision making, and the department course for this patient. I have also

personally directed, reviewed, and agree with the discharge instructions and 

disposition.








Disposition





- Clinical Impression


Clinical Impression: 


 Alcohol intoxication








- Patient ED Disposition


Is Patient to be Admitted: Transfer of Care





- Disposition


Disposition: Transfer of Care


Disposition Time: 00:01


Condition: FAIR


Patient Signed Over To: Pema Gutierrez


Handoff Comments: PENDING CRISIS EVAL
Pt called to discuss recent bite from an 10 yoa patient on the behavioral unit at [facility]. She is currently 32w3d EGA.   Apparently, her initial consult with their employee health (safety hotline) recommended she wash the wound, but they did not recommend seeing her occupational health at that time, they recommended she FU with OB. When she called our office, we recommended she follow advice from her employee health provider / hotline. Pt states this advice was to use topical abx daily and washing the wound. They did not recommend seeing other occupational health provider at that time since there was \"no blood in patients mouth at time of bite\" according to our patient.  States she has been applying ointment daily and the wound was still weeping, denies pain / redness.   A co-worker recommended she see employee health ([facility]) today. She states she was seen by the Dr with employee health at her hospital and he recommended Augmentin and blood work for blood borne pathogens, which I very much agree with.   She denies any VB, DFM or LOF.   Admits to occasional non painful contractions with working.   Denies fevers, chills, nausea, emesis.   I recommended she send photo of wound over myChart tonight for reference.   I reviewed careful return precautions and FU instructions.   I strongly support her employer screening both patient and employee for blood borne pathogens.   IPV labs all NR at beginning of pregnancy -- see epic.   She has a visit to see us next week, keep this unless needing to be seen sooner.     OMAR            
Statement Selected

## 2024-11-30 NOTE — OB RN DELIVERY SUMMARY - NSNUMBEROFNEWBORNS_OBGYN_ALL_OB_NU
-A prescription for an epi pen is available for you at the [facility] (By main entrance)  -F/u with the allergist in 1 week     Allergic Reaction    An allergic reaction is an abnormal reaction to a substance (allergen) by the body's defense system. Common allergens include medicines, food, insect bites or stings, and blood products. The body releases certain proteins into the blood that can cause a variety of symptoms such as an itchy rash, wheezing, swelling of the face/lips/tongue/throat, abdominal pain, nausea or vomiting. An allergic reaction is usually treated with medication. If your health care provider prescribed you an epinephrine injection device, make sure to keep it with you at all times.    SEEK IMMEDIATE MEDICAL CARE IF YOU HAVE ANY OF THE FOLLOWING SYMPTOMS: allergic reaction severe enough that required you to use epinephrine, tightness in your chest, swelling around your lips/tongue/throat, abdominal pain, vomiting or diarrhea, or lightheadedness/dizziness. These symptoms may represent a serious problem that is an emergency. Do not wait to see if the symptoms will go away. Use your auto-injector pen or anaphylaxis kit as you have been instructed. Call 911 and do not drive yourself to the hospital.
1

## 2024-11-30 NOTE — DISCHARGE NOTE OB - PATIENT PORTAL LINK FT
You can access the FollowMyHealth Patient Portal offered by [facility] by registering at the following website: [email or web address]. By joining [email or web address]’s FollowMyHealth portal, you will also be able to view your health information using other applications (apps) compatible with our system.

## 2024-11-30 NOTE — OB PROVIDER DELIVERY SUMMARY - AS DELIV COMPLICATIONS OB
Medication Question or Refill    Contacts       Type Contact Phone/Fax    02/27/2023 12:18 PM CST Phone (Incoming) Melody Muñoz (Self) [phone] (M)          What medication are you calling about (include dose and sig)?: oxyCODONE-acetaminophen (PERCOCET) 5-325 MG tablet    Preferred Pharmacy:  [id number] [address] AT [place] [address] [address]  [address] [place] [address]  Phone: [phone] Fax: [phone]      Controlled Substance Agreement on file:   CSA -- Patient Level:     [Media Unavailable] Controlled Substance Agreement - Opioid - Scan on 1/24/2023  8:13 AM   [Media Unavailable] Controlled Substance Agreement - Opioid - Scan on 9/30/2021  2:39 PM       Who prescribed the medication?: Fitterer    Do you need a refill? Yes    When did you use the medication last? today    Patient offered an appointment? No    Do you have any questions or concerns?  No    Bess Garcia/Roddy-  [facility]         none

## 2024-11-30 NOTE — OB PROVIDER H&P - HISTORY OF PRESENT ILLNESS
30yo female  @ 39.6 wks SLIUP uncomp PNC here complaining of loss of mucous plug yesterday morning with the one time trickle of fluid yesterday morning at 9:30am with decrease in fetal movement since yesterday. Pt reports she had some cramping yesterday that resolved. Pt denies ctx's currently.    PNC uncomplicated; GBS negative as of 10/30/2024

## 2024-11-30 NOTE — OB PROVIDER DELIVERY SUMMARY - NSPROVIDERDELIVERYNOTE_OBGYN_ALL_OB_FT
Pt found to be fully dilated with urge to push,  of live born male infant. Head delivered easily in GAURANG position.  Shoulders and body then delivered easily.  Cord clamping was delayed and then cut uncomplicated. Placenta delivered intact. Bimanual exam performed, with minimal clot evacuated. Fundus and lower uterine segment firm.  Vaginal exam revealed intact cervix, vaginal walls, sulci. No lacteration noted Excellent hemostasis noted after delivery.    Ivy Lares, PGY-2 Pt found to be fully dilated with urge to push,  of live born male infant. Head delivered easily in GAURANG position.  Shoulders and body then delivered easily.  Cord clamping was delayed and then cut uncomplicated. Placenta delivered intact. Bimanual exam performed, with minimal clot evacuated. Fundus and lower uterine segment firm.  Vaginal exam revealed intact cervix, vaginal walls, sulci. No lacteration noted Excellent hemostasis noted after delivery. Rectum intact.    Ivy Lares, PGY-2    Ob attending    I was present and participated in   liveborn male infant without complication as above.    Isaac CARPIO

## 2024-11-30 NOTE — OB PROVIDER H&P - NS_OBGYNHISTORY_OBGYN_ALL_OB_FT
x 3  2017 m 7-4 full term uncomplicated   3/1/2020 m 7-4 full term uncomplicated  2023 f 7-11 full term uncomplicated

## 2024-11-30 NOTE — OB RN PATIENT PROFILE - TERM DELIVERIES, OB PROFILE
#3

## 2024-11-30 NOTE — OB RN DELIVERY SUMMARY - NS_SEPSISRSKCALC_OBGYN_ALL_OB_FT
EOS calculated successfully. EOS Risk Factor: 0.5/1000 live births ([facility] national incidence); GA=39w6d; Temp=98.24; ROM=1.633; GBS='Negative'; Antibiotics='No antibiotics or any antibiotics < 2 hrs prior to birth'

## 2024-11-30 NOTE — OB PROVIDER DELIVERY SUMMARY - NSSELHIDDEN_OBGYN_ALL_OB_FT
[NS_DeliveryAttending1_OBGYN_ALL_OB_FT:[id number]],[NS_DeliveryAssist1_OBGYN_ALL_OB_FT:[id number]=]

## 2024-12-01 LAB
ANION GAP SERPL CALC-SCNC: 13 MMOL/L — SIGNIFICANT CHANGE UP (ref 7–14)
BUN SERPL-MCNC: 8 MG/DL — SIGNIFICANT CHANGE UP (ref 7–23)
CALCIUM SERPL-MCNC: 8.1 MG/DL — LOW (ref 8.4–10.5)
CHLORIDE SERPL-SCNC: 107 MMOL/L — SIGNIFICANT CHANGE UP (ref 98–107)
CO2 SERPL-SCNC: 18 MMOL/L — LOW (ref 22–31)
CREAT SERPL-MCNC: 0.43 MG/DL — LOW (ref 0.5–1.3)
EGFR: 134 ML/MIN/1.73M2 — SIGNIFICANT CHANGE UP
GLUCOSE SERPL-MCNC: 81 MG/DL — SIGNIFICANT CHANGE UP (ref 70–99)
HCT VFR BLD CALC: 34.6 % — SIGNIFICANT CHANGE UP (ref 34.5–45)
HGB BLD-MCNC: 11.6 G/DL — SIGNIFICANT CHANGE UP (ref 11.5–15.5)
MAGNESIUM SERPL-MCNC: 1.8 MG/DL — SIGNIFICANT CHANGE UP (ref 1.6–2.6)
MCHC RBC-ENTMCNC: 31.8 PG — SIGNIFICANT CHANGE UP (ref 27–34)
MCHC RBC-ENTMCNC: 33.5 G/DL — SIGNIFICANT CHANGE UP (ref 32–36)
MCV RBC AUTO: 94.8 FL — SIGNIFICANT CHANGE UP (ref 80–100)
NRBC # BLD: 0 /100 WBCS — SIGNIFICANT CHANGE UP (ref 0–0)
NRBC # FLD: 0 K/UL — SIGNIFICANT CHANGE UP (ref 0–0)
PHOSPHATE SERPL-MCNC: 3.9 MG/DL — SIGNIFICANT CHANGE UP (ref 2.5–4.5)
PLATELET # BLD AUTO: 181 K/UL — SIGNIFICANT CHANGE UP (ref 150–400)
POTASSIUM SERPL-MCNC: 4 MMOL/L — SIGNIFICANT CHANGE UP (ref 3.5–5.3)
POTASSIUM SERPL-SCNC: 4 MMOL/L — SIGNIFICANT CHANGE UP (ref 3.5–5.3)
RBC # BLD: 3.65 M/UL — LOW (ref 3.8–5.2)
RBC # FLD: 13.2 % — SIGNIFICANT CHANGE UP (ref 10.3–14.5)
SODIUM SERPL-SCNC: 138 MMOL/L — SIGNIFICANT CHANGE UP (ref 135–145)
T PALLIDUM AB TITR SER: NEGATIVE — SIGNIFICANT CHANGE UP
WBC # BLD: 8.3 K/UL — SIGNIFICANT CHANGE UP (ref 3.8–10.5)
WBC # FLD AUTO: 8.3 K/UL — SIGNIFICANT CHANGE UP (ref 3.8–10.5)

## 2024-12-01 PROCEDURE — 99233 SBSQ HOSP IP/OBS HIGH 50: CPT

## 2024-12-01 RX ORDER — IBUPROFEN 200 MG
600 TABLET ORAL EVERY 6 HOURS
Refills: 0 | Status: DISCONTINUED | OUTPATIENT
Start: 2024-12-01 | End: 2024-12-02

## 2024-12-01 RX ADMIN — ACETAMINOPHEN 500MG 975 MILLIGRAM(S): 500 TABLET, COATED ORAL at 11:17

## 2024-12-01 RX ADMIN — Medication 25 GRAM(S): at 18:04

## 2024-12-01 RX ADMIN — ACETAMINOPHEN 500MG 975 MILLIGRAM(S): 500 TABLET, COATED ORAL at 02:07

## 2024-12-01 RX ADMIN — ACETAMINOPHEN 500MG 975 MILLIGRAM(S): 500 TABLET, COATED ORAL at 19:01

## 2024-12-01 RX ADMIN — ACETAMINOPHEN 500MG 975 MILLIGRAM(S): 500 TABLET, COATED ORAL at 18:01

## 2024-12-01 RX ADMIN — ACETAMINOPHEN 500MG 975 MILLIGRAM(S): 500 TABLET, COATED ORAL at 12:17

## 2024-12-01 RX ADMIN — Medication 400 MILLIGRAM(S): at 01:07

## 2024-12-01 RX ADMIN — .BETA.-CAROTENE, SODIUM ACETATE, ASCORBIC ACID, CHOLECALCIFEROL, .ALPHA.-TOCOPHEROL ACETATE, DL-, THIAMINE MONONITRATE, RIBOFLAVIN, NIACINAMIDE, PYRIDOXINE HYDROCHLORIDE, FOLIC ACID, CYANOCOBALAMIN, CALCIUM CARBONATE, FERROUS FUMARATE, ZINC OXIDE AND CUPRIC OXIDE 1 TABLET(S): 2000; 2000; 120; 400; 22; 1.84; 3; 20; 10; 1; 12; 200; 27; 25; 2 TABLET ORAL at 11:17

## 2024-12-01 RX ADMIN — SODIUM CHLORIDE 3 MILLILITER(S): 9 INJECTION, SOLUTION INTRAMUSCULAR; INTRAVENOUS; SUBCUTANEOUS at 14:44

## 2024-12-01 RX ADMIN — ACETAMINOPHEN 500MG 975 MILLIGRAM(S): 500 TABLET, COATED ORAL at 01:07

## 2024-12-01 RX ADMIN — SODIUM CHLORIDE 3 MILLILITER(S): 9 INJECTION, SOLUTION INTRAMUSCULAR; INTRAVENOUS; SUBCUTANEOUS at 06:12

## 2024-12-01 NOTE — PROGRESS NOTE ADULT - ASSESSMENT
30y/o PPD#1 from  in stable condition. On PPD0, pt noted to be floyd to 30s, resolved. Pt doing well this AM and is meeting pp milestones.    #Floyd  - s/p cardio consult: f/u final recs  - cont on tele  - cont to monitor vitals and symptoms     #Postpartum  - Continue with PO analgesia  - Increase ambulation  - Continue regular diet  - IV lock    Arlene Gray PGY1

## 2024-12-01 NOTE — CONSULT NOTE ADULT - ASSESSMENT
HPI: 30y/o F postpartum was noted to have HR in 30s on vitals machine for which cardiology was consulted.     Pt is postpartum and during regular vitals check using electronic vitals machine was noted to have HR in 30s. EKG noted to be NSR w no acute ischemic changes. Pt denies any symptoms of chest pain, sob, palpitations, dizziness or lightheadedness. Denies any previous h/o syncope. No prior h/o cardiac arrythmia. Not on BB/CCB. No family h/o sudden cardiac deaths. No h/o thyroid illness. Pt does endorse lower extremity swelling during her pregnancy.     Assessment and Plan:  # ?Bradycardia  Currently no evidence suggestive of bradycardia. No EKG or tele strips w bradycardia  No acute ischemic changes on EKG. Pt asymptomatic, VSS. TSH 5.29, T4 0.9  - No emergent need for PPM/TVP overnight  - Continue tele monitoring  - Hold BB/CCB  - Replete lytes to keep K>4, Mg>2    Case discussed w fellow Dr Bk reis  Cardiology will follow  HPI: 30y/o F postpartum was noted to have HR in 30s on vitals machine for which cardiology was consulted.     Pt is postpartum and during regular vitals check using electronic vitals machine was noted to have HR in 30s. EKG noted to be NSR w no acute ischemic changes. Pt denies any symptoms of chest pain, sob, palpitations, dizziness or lightheadedness. Denies any previous h/o syncope. No prior h/o cardiac arrythmia. Not on BB/CCB. No family h/o sudden cardiac deaths. No h/o thyroid illness. Pt does endorse lower extremity swelling during her pregnancy.     Assessment and Plan:  # ?Bradycardia  Currently no evidence suggestive of bradycardia. No EKG or tele strips w bradycardia  No acute ischemic changes on EKG. Pt asymptomatic, VSS. TSH 5.29, T4 0.9  - No emergent need for PPM/TVP overnight  - Continue tele monitoring  - Hold BB/CCB  - Replete lytes to keep K>4, Mg>2    Case discussed w fellow Dr Bk Kasper

## 2024-12-01 NOTE — CONSULT NOTE ADULT - NS ATTEND AMEND GEN_ALL_CORE FT
Transient bradycardia reported immediately following delivery yesterday, resolved.  ECG 11/30 shows sinus bradycardia 51 bpm, borderline T wave abnormality in V1, V2, normal conduction.    Patient seen and examined on maternity unit today. She reports feeling fine and not having dizziness at rest or during ambulation.    Transient bradycardia likely related to increased vagal tone.    If asymptomatic and no further events on telemetry, may be discharged from the cardiovascular point of view.    Discussed with the patient and family at bedside.    REAGAN Nascimento MD [facility] Transient bradycardia (HR 30s) reported following spontaneous vaginal delivery at term yesterday, resolved.  ECG 11/30 shows sinus bradycardia 59 bpm, borderline T wave abnormality in V1, V2, normal conduction.    Patient seen and examined on maternity unit today. She reports feeling fine and not having dizziness at rest or during ambulation.    Transient bradycardia likely related to increased vagal tone.    If asymptomatic and no further events on telemetry, may be discharged from the cardiovascular point of view.    Discussed with the patient and family at bedside.    REAGAN Nascimento MD [facility]

## 2024-12-01 NOTE — CONSULT NOTE ADULT - SUBJECTIVE AND OBJECTIVE BOX
HPI: 30y/o F postpartum was noted to have HR in 30s on vitals machine for which cardiology was consulted.     Pt is postpartum and during regular vitals check using electronic vitals machine was noted to have HR in 30s. EKG noted to be NSR w no acute ischemic changes. Pt denies any symptoms of chest pain, sob, palpitations, dizziness or lightheadedness. Denies any previous h/o syncope. No prior h/o cardiac arrythmia. Not on BB/CCB. No family h/o sudden cardiac deaths. No h/o thyroid illness. Pt does endorse lower extremity swelling during her pregnancy.     ROS as above    T(C): 36.9 (11-30-24 @ 23:38), Max: 36.9 (11-30-24 @ 23:38)  HR: 76 (11-30-24 @ 23:38) (38 - 95)  BP: 110/70 (11-30-24 @ 23:38) (100/60 - 133/58)  RR: 17 (11-30-24 @ 23:38) (16 - 17)  SpO2: 97% (11-30-24 @ 23:38) (74% - 100%)    MEDICATIONS  (STANDING):  acetaminophen     Tablet .. 975 milliGRAM(s) Oral <User Schedule>  diphtheria/tetanus/pertussis (acellular) Vaccine (Adacel) 0.5 milliLiter(s) IntraMuscular once  ibuprofen  Tablet. 600 milliGRAM(s) Oral every 6 hours  influenza   Vaccine 0.5 milliLiter(s) IntraMuscular once  magnesium oxide 400 milliGRAM(s) Oral once  oxytocin Infusion 167 milliUNIT(s)/Min (167 mL/Hr) IV Continuous <Continuous>  oxytocin Infusion 167 milliUNIT(s)/Min (167 mL/Hr) IV Continuous <Continuous>  oxytocin Infusion. 6 milliUNIT(s)/Min (6 mL/Hr) IV Continuous <Continuous>  prenatal multivitamin 1 Tablet(s) Oral daily  sodium chloride 0.9% lock flush 3 milliLiter(s) IV Push every 8 hours    MEDICATIONS  (PRN):  benzocaine 20%/menthol 0.5% Spray 1 Spray(s) Topical every 6 hours PRN for Perineal discomfort  dibucaine 1% Ointment 1 Application(s) Topical every 6 hours PRN Perineal discomfort  diphenhydrAMINE 25 milliGRAM(s) Oral every 6 hours PRN Pruritus  hydrocortisone 1% Cream 1 Application(s) Topical every 6 hours PRN Moderate Pain (4-6)  lanolin Ointment 1 Application(s) Topical every 6 hours PRN nipple soreness  magnesium hydroxide Suspension 30 milliLiter(s) Oral two times a day PRN Constipation  oxyCODONE    IR 5 milliGRAM(s) Oral every 3 hours PRN Moderate to Severe Pain (4-10)  oxyCODONE    IR 5 milliGRAM(s) Oral once PRN Moderate to Severe Pain (4-10)  pramoxine 1%/zinc 5% Cream 1 Application(s) Topical every 4 hours PRN Moderate Pain (4-6)  simethicone 80 milliGRAM(s) Chew every 4 hours PRN Gas  witch hazel Pads 1 Application(s) Topical every 4 hours PRN Perineal discomfort      I&O's Summary    30 Nov 2024 07:01  -  01 Dec 2024 00:22  --------------------------------------------------------  IN: 1460 mL / OUT: 437 mL / NET: 1023 mL                          11.0                 135  | 17   | 9            7.02  >-----------< 178     ------------------------< 92                    33.6                 3.9  | 106  | 0.43                                         Ca 8.5   Mg 1.60  Ph 3.4    Urinalysis Basic - ( 30 Nov 2024 21:34 )    Color: x / Appearance: x / SG: x / pH: x  Gluc: 92 mg/dL / Ketone: x  / Bili: x / Urobili: x   Blood: x / Protein: x / Nitrite: x   Leuk Esterase: x / RBC: x / WBC x   Sq Epi: x / Non Sq Epi: x / Bacteria: x

## 2024-12-02 ENCOUNTER — APPOINTMENT (OUTPATIENT)
Facility: CLINIC | Age: 29
End: 2024-12-02

## 2024-12-02 VITALS
TEMPERATURE: 98 F | DIASTOLIC BLOOD PRESSURE: 74 MMHG | SYSTOLIC BLOOD PRESSURE: 110 MMHG | HEART RATE: 78 BPM | OXYGEN SATURATION: 100 % | RESPIRATION RATE: 17 BRPM

## 2024-12-02 LAB
ALBUMIN SERPL ELPH-MCNC: 2.9 G/DL — LOW (ref 3.3–5)
ALP SERPL-CCNC: 206 U/L — HIGH (ref 40–120)
ALT FLD-CCNC: 14 U/L — SIGNIFICANT CHANGE UP (ref 4–33)
ANION GAP SERPL CALC-SCNC: 9 MMOL/L — SIGNIFICANT CHANGE UP (ref 7–14)
AST SERPL-CCNC: 20 U/L — SIGNIFICANT CHANGE UP (ref 4–32)
BILIRUB SERPL-MCNC: 0.2 MG/DL — SIGNIFICANT CHANGE UP (ref 0.2–1.2)
BUN SERPL-MCNC: 6 MG/DL — LOW (ref 7–23)
CALCIUM SERPL-MCNC: 8.7 MG/DL — SIGNIFICANT CHANGE UP (ref 8.4–10.5)
CHLORIDE SERPL-SCNC: 106 MMOL/L — SIGNIFICANT CHANGE UP (ref 98–107)
CO2 SERPL-SCNC: 23 MMOL/L — SIGNIFICANT CHANGE UP (ref 22–31)
CREAT SERPL-MCNC: 0.5 MG/DL — SIGNIFICANT CHANGE UP (ref 0.5–1.3)
EGFR: 129 ML/MIN/1.73M2 — SIGNIFICANT CHANGE UP
GLUCOSE SERPL-MCNC: 68 MG/DL — LOW (ref 70–99)
HCT VFR BLD CALC: 37.6 % — SIGNIFICANT CHANGE UP (ref 34.5–45)
HGB BLD-MCNC: 12.2 G/DL — SIGNIFICANT CHANGE UP (ref 11.5–15.5)
MAGNESIUM SERPL-MCNC: 1.8 MG/DL — SIGNIFICANT CHANGE UP (ref 1.6–2.6)
MCHC RBC-ENTMCNC: 31 PG — SIGNIFICANT CHANGE UP (ref 27–34)
MCHC RBC-ENTMCNC: 32.4 G/DL — SIGNIFICANT CHANGE UP (ref 32–36)
MCV RBC AUTO: 95.7 FL — SIGNIFICANT CHANGE UP (ref 80–100)
NRBC # BLD: 0 /100 WBCS — SIGNIFICANT CHANGE UP (ref 0–0)
NRBC # FLD: 0 K/UL — SIGNIFICANT CHANGE UP (ref 0–0)
PHOSPHATE SERPL-MCNC: 3.7 MG/DL — SIGNIFICANT CHANGE UP (ref 2.5–4.5)
PLATELET # BLD AUTO: 200 K/UL — SIGNIFICANT CHANGE UP (ref 150–400)
POTASSIUM SERPL-MCNC: 4.3 MMOL/L — SIGNIFICANT CHANGE UP (ref 3.5–5.3)
POTASSIUM SERPL-SCNC: 4.3 MMOL/L — SIGNIFICANT CHANGE UP (ref 3.5–5.3)
PROT SERPL-MCNC: 5.6 G/DL — LOW (ref 6–8.3)
RBC # BLD: 3.93 M/UL — SIGNIFICANT CHANGE UP (ref 3.8–5.2)
RBC # FLD: 13.7 % — SIGNIFICANT CHANGE UP (ref 10.3–14.5)
SODIUM SERPL-SCNC: 138 MMOL/L — SIGNIFICANT CHANGE UP (ref 135–145)
WBC # BLD: 7.65 K/UL — SIGNIFICANT CHANGE UP (ref 3.8–10.5)
WBC # FLD AUTO: 7.65 K/UL — SIGNIFICANT CHANGE UP (ref 3.8–10.5)

## 2024-12-02 RX ADMIN — Medication 600 MILLIGRAM(S): at 02:10

## 2024-12-02 RX ADMIN — Medication 400 MILLIGRAM(S): at 12:26

## 2024-12-02 RX ADMIN — Medication 600 MILLIGRAM(S): at 01:10

## 2024-12-02 NOTE — PROGRESS NOTE ADULT - SUBJECTIVE AND OBJECTIVE BOX
Anesthesia Post-op Note    POD#1 S/P vaginal delivery    Patient is doing well.  OOBAA. Tolerating clears.  Pain is tolerable.  No residual anesthetic issues or complications noted.    Bharat Carrera DNP, CRNA
S: Patient doing well.   Pain controlled.  +OOB.  +void.  Tolerating PO.  Lochia WNL.    O: Vital Signs Last 24 Hrs  T(C): 36.4 (02 Dec 2024 06:14), Max: 37.2 (01 Dec 2024 22:05)  T(F): 97.5 (02 Dec 2024 06:14), Max: 98.9 (01 Dec 2024 22:05)  HR: 64 (02 Dec 2024 06:14) (63 - 78)  BP: 112/85 (02 Dec 2024 06:14) (103/66 - 113/84)  BP(mean): --  RR: 18 (02 Dec 2024 06:14) (15 - 18)  SpO2: 100% (02 Dec 2024 06:14) (99% - 100%)    Parameters below as of 02 Dec 2024 06:14  Patient On (Oxygen Delivery Method): room air        Gen: NAD  Abd: soft, NT, ND.   fundus firm below umbilicus, NT.  Ext: no tenderness B/L    Labs:                        12.2   7.65  )-----------( 200      ( 02 Dec 2024 05:50 )             37.6       ABO Interpretation: AB (1130 @ 16:57)    Rh Interpretation: Positive (11 @ 16:57)    Antibody Screen Negative      A: 29y PPD#1 s/p  doing well.   -Continue routine postpartum care.  -awaiting endocrine for recommendations for synthroid before discharge for elevated TSH  -Discharge planning and instructions given. 
R1 Progress Note    Patient seen and examined at bedside, no acute overnight events. No acute complaints, pain well controlled. Patient is ambulating, voiding spontaneously, passing gas, and tolerating regular diet. Denies CP, SOB, N/V, HA, or blurred vision.     Vital Signs Last 24 Hours  T(C): 36.4 (12-01-24 @ 06:13), Max: 36.9 (11-30-24 @ 23:38)  HR: 62 (12-01-24 @ 06:13) (38 - 95)  BP: 115/78 (12-01-24 @ 06:13) (100/60 - 133/58)  RR: 18 (12-01-24 @ 06:13) (16 - 18)  SpO2: 100% (12-01-24 @ 06:13) (74% - 100%)    Physical Exam:  General: NAD  Abdomen: Soft, non-tender, non-distended, fundus firm  Pelvic: Lochia wnl    Labs:    Blood Type: AB Positive  Antibody Screen: Negative  RPR: Negative               11.6   8.30  )-----------( 181      ( 12-01 @ 05:49 )             34.6                11.0   7.02  )-----------( 178      ( 11-30 @ 21:34 )             33.6                12.1   7.51  )-----------( 207      ( 11-30 @ 16:47 )             36.2         MEDICATIONS  (STANDING):  acetaminophen     Tablet .. 975 milliGRAM(s) Oral <User Schedule>  diphtheria/tetanus/pertussis (acellular) Vaccine (Adacel) 0.5 milliLiter(s) IntraMuscular once  ibuprofen  Tablet. 600 milliGRAM(s) Oral every 6 hours  influenza   Vaccine 0.5 milliLiter(s) IntraMuscular once  oxytocin Infusion 167 milliUNIT(s)/Min (167 mL/Hr) IV Continuous <Continuous>  oxytocin Infusion 167 milliUNIT(s)/Min (167 mL/Hr) IV Continuous <Continuous>  oxytocin Infusion. 6 milliUNIT(s)/Min (6 mL/Hr) IV Continuous <Continuous>  prenatal multivitamin 1 Tablet(s) Oral daily  sodium chloride 0.9% lock flush 3 milliLiter(s) IV Push every 8 hours    MEDICATIONS  (PRN):  benzocaine 20%/menthol 0.5% Spray 1 Spray(s) Topical every 6 hours PRN for Perineal discomfort  dibucaine 1% Ointment 1 Application(s) Topical every 6 hours PRN Perineal discomfort  diphenhydrAMINE 25 milliGRAM(s) Oral every 6 hours PRN Pruritus  hydrocortisone 1% Cream 1 Application(s) Topical every 6 hours PRN Moderate Pain (4-6)  lanolin Ointment 1 Application(s) Topical every 6 hours PRN nipple soreness  magnesium hydroxide Suspension 30 milliLiter(s) Oral two times a day PRN Constipation  oxyCODONE    IR 5 milliGRAM(s) Oral every 3 hours PRN Moderate to Severe Pain (4-10)  oxyCODONE    IR 5 milliGRAM(s) Oral once PRN Moderate to Severe Pain (4-10)  pramoxine 1%/zinc 5% Cream 1 Application(s) Topical every 4 hours PRN Moderate Pain (4-6)  simethicone 80 milliGRAM(s) Chew every 4 hours PRN Gas  witch hazel Pads 1 Application(s) Topical every 4 hours PRN Perineal discomfort  
S: Patient doing well.   Pain controlled.  +OOB.  +void.  Tolerating PO.  Lochia WNL.    O: Vital Signs Last 24 Hrs  T(C): 36.3 (01 Dec 2024 01:46), Max: 36.9 (2024 23:38)  T(F): 97.3 (01 Dec 2024 01:46), Max: 98.5 (2024 23:38)  HR: 72 (01 Dec 2024 01:46) (38 - 95)  BP: 103/59 (01 Dec 2024 01:46) (100/60 - 133/58)  BP(mean): --  RR: 18 (01 Dec 2024 01:46) (16 - 18)  SpO2: 99% (01 Dec 2024 01:46) (74% - 100%)    Parameters below as of 01 Dec 2024 01:46  Patient On (Oxygen Delivery Method): room air        Gen: NAD  Abd: soft, NT, ND.   fundus firm below umbilicus, NT.  Ext: no tenderness B/L    Labs:                        11.0   7.02  )-----------( 178      ( 2024 21:34 )             33.6       ABO Interpretation: AB ( @ 16:57)    Rh Interpretation: Positive ( @ 16:57)    Antibody Screen Negative      A: 29y PPD#1 s/p  doing well.   -Continue routine postpartum care.  -labs today  -continue tele monitoring as arrythmia overnight  -cards f/u

## 2024-12-02 NOTE — CHART NOTE - NSCHARTNOTEFT_GEN_A_CORE
Called for recommendation regarding mild TSH elevation.    Pt is a 29 year old woman, now PPD 1 after .  She had bradycardia noted on electronic vital machine on , was asymptomatic, and no bradycardia noted on EKG or tele strips.  No recurrent episodes.  TFTs checked at that time showed mild TSH elevation with normal FT4.   TSH in 2024 was 2.5.  No prior history of hypothyroidism.    Recommend repeating TSH and Free T4 as outpt in 4 weeks.    Discussed with primary team    Teresita Leyva MD  On 2024: via Teams or pager    Other times:  Diabetes team: [phone]   or email [email or web address]
Ob attending    pt comfortable  ve 6/90/-3  fht 130 moderate variability accels no decels  tocoq 5 min  a/p cat 1 fht  arom clear  for pitocin    Isaac CARPIO
Patient seen at bedside to follow up on elevated TSH. Patient made aware that her TSH level was elevated on lab work. Endocrinology was reached out to (see chart note). Patient made aware at this time to follow up and repeat her TFTs in 4 weeks as an outpatient.     Arlene Gray PGY1
Ob attending    pt with mild ctx and now FM  fht 150 moderate variability accels s/p IVF bolus-- was tachycardic at 160s  toco q 2-3 mild  a/p cat 1 now  reassuring  GBS neg  for epi then AROM    Isaac CARPIO

## 2024-12-23 ENCOUNTER — APPOINTMENT (OUTPATIENT)
Facility: CLINIC | Age: 29
End: 2024-12-23

## 2025-01-07 ENCOUNTER — NON-APPOINTMENT (OUTPATIENT)
Age: 30
End: 2025-01-07

## 2025-01-08 ENCOUNTER — APPOINTMENT (OUTPATIENT)
Facility: CLINIC | Age: 30
End: 2025-01-08

## 2025-01-15 ENCOUNTER — APPOINTMENT (OUTPATIENT)
Facility: CLINIC | Age: 30
End: 2025-01-15

## 2025-01-15 DIAGNOSIS — I49.9 CARDIAC ARRHYTHMIA, UNSPECIFIED: ICD-10-CM

## 2025-01-15 PROCEDURE — 99401 PREV MED CNSL INDIV APPRX 15: CPT

## 2025-01-15 RX ORDER — DROSPIRENONE 4 MG/1
4 TABLET, FILM COATED ORAL
Qty: 2 | Refills: 1 | Status: ACTIVE | COMMUNITY
Start: 2025-01-15 | End: 1900-01-01

## 2025-05-12 ENCOUNTER — APPOINTMENT (OUTPATIENT)
Facility: CLINIC | Age: 30
End: 2025-05-12